# Patient Record
Sex: MALE | Race: WHITE | Employment: UNEMPLOYED | URBAN - METROPOLITAN AREA
[De-identification: names, ages, dates, MRNs, and addresses within clinical notes are randomized per-mention and may not be internally consistent; named-entity substitution may affect disease eponyms.]

---

## 2020-07-04 ENCOUNTER — HOSPITAL ENCOUNTER (EMERGENCY)
Age: 40
Discharge: HOME | End: 2020-07-04
Attending: EMERGENCY MEDICINE
Payer: COMMERCIAL

## 2020-07-04 VITALS
HEIGHT: 71 IN | SYSTOLIC BLOOD PRESSURE: 111 MMHG | BODY MASS INDEX: 21 KG/M2 | HEART RATE: 99 BPM | RESPIRATION RATE: 20 BRPM | WEIGHT: 150 LBS | DIASTOLIC BLOOD PRESSURE: 65 MMHG | OXYGEN SATURATION: 98 % | TEMPERATURE: 98 F

## 2020-07-04 LAB
CHP ED QC CHECK: YES
GLUCOSE BLD-MCNC: 97 MG/DL
METER GLUCOSE: 97 MG/DL (ref 74–99)

## 2020-07-04 PROCEDURE — 82962 GLUCOSE BLOOD TEST: CPT

## 2020-07-04 PROCEDURE — 99284 EMERGENCY DEPT VISIT MOD MDM: CPT

## 2020-07-04 NOTE — ED NOTES
Pt awake, asked what happen to him this nurse explained he was found unresponsive and administered narcan. Pt denies using drugs to cause a overdose. States he takes pain medication but no more that prescribed.        Flaca Hampton RN  07/04/20 2583

## 2020-10-16 ENCOUNTER — HOSPITAL ENCOUNTER (OUTPATIENT)
Dept: MAMMOGRAPHY | Age: 40
Discharge: HOME | End: 2020-10-18
Payer: COMMERCIAL

## 2020-10-16 PROCEDURE — 77080 DXA BONE DENSITY AXIAL: CPT

## 2022-09-17 ENCOUNTER — HOSPITAL ENCOUNTER (EMERGENCY)
Age: 42
Discharge: HOME | End: 2022-09-17
Attending: EMERGENCY MEDICINE
Payer: COMMERCIAL

## 2022-09-17 VITALS
HEART RATE: 85 BPM | OXYGEN SATURATION: 93 % | RESPIRATION RATE: 18 BRPM | DIASTOLIC BLOOD PRESSURE: 95 MMHG | HEIGHT: 71 IN | WEIGHT: 235 LBS | SYSTOLIC BLOOD PRESSURE: 143 MMHG | BODY MASS INDEX: 32.9 KG/M2 | TEMPERATURE: 97.7 F

## 2022-09-17 DIAGNOSIS — T40.601A OPIATE OVERDOSE, ACCIDENTAL OR UNINTENTIONAL, INITIAL ENCOUNTER (HCC): Primary | ICD-10-CM

## 2022-09-17 LAB
CHP ED QC CHECK: NORMAL
EKG ATRIAL RATE: 97 BPM
EKG P AXIS: 57 DEGREES
EKG P-R INTERVAL: 148 MS
EKG Q-T INTERVAL: 362 MS
EKG QRS DURATION: 92 MS
EKG QTC CALCULATION (BAZETT): 459 MS
EKG R AXIS: 16 DEGREES
EKG T AXIS: -8 DEGREES
EKG VENTRICULAR RATE: 97 BPM
GLUCOSE BLD-MCNC: 144 MG/DL
METER GLUCOSE: 144 MG/DL (ref 74–99)

## 2022-09-17 PROCEDURE — 99284 EMERGENCY DEPT VISIT MOD MDM: CPT

## 2022-09-17 PROCEDURE — 6370000000 HC RX 637 (ALT 250 FOR IP): Performed by: STUDENT IN AN ORGANIZED HEALTH CARE EDUCATION/TRAINING PROGRAM

## 2022-09-17 PROCEDURE — 82962 GLUCOSE BLOOD TEST: CPT

## 2022-09-17 PROCEDURE — 93005 ELECTROCARDIOGRAM TRACING: CPT | Performed by: STUDENT IN AN ORGANIZED HEALTH CARE EDUCATION/TRAINING PROGRAM

## 2022-09-17 PROCEDURE — 6360000002 HC RX W HCPCS: Performed by: STUDENT IN AN ORGANIZED HEALTH CARE EDUCATION/TRAINING PROGRAM

## 2022-09-17 PROCEDURE — 96374 THER/PROPH/DIAG INJ IV PUSH: CPT

## 2022-09-17 RX ORDER — ONDANSETRON 2 MG/ML
4 INJECTION INTRAMUSCULAR; INTRAVENOUS ONCE
Status: COMPLETED | OUTPATIENT
Start: 2022-09-17 | End: 2022-09-17

## 2022-09-17 RX ORDER — ACETAMINOPHEN 500 MG
1000 TABLET ORAL ONCE
Status: COMPLETED | OUTPATIENT
Start: 2022-09-17 | End: 2022-09-17

## 2022-09-17 RX ADMIN — ONDANSETRON 4 MG: 2 INJECTION INTRAMUSCULAR; INTRAVENOUS at 15:59

## 2022-09-17 RX ADMIN — ACETAMINOPHEN 1000 MG: 500 TABLET ORAL at 17:59

## 2022-09-17 ASSESSMENT — ENCOUNTER SYMPTOMS
EYE DISCHARGE: 0
ABDOMINAL PAIN: 0
VOMITING: 1
BACK PAIN: 0
NAUSEA: 1
SORE THROAT: 0
SINUS PRESSURE: 0
WHEEZING: 0
SHORTNESS OF BREATH: 0
DIARRHEA: 0
COUGH: 0
EYE PAIN: 0
EYE REDNESS: 0

## 2022-09-17 ASSESSMENT — PAIN DESCRIPTION - DESCRIPTORS: DESCRIPTORS: ACHING

## 2022-09-17 ASSESSMENT — PAIN DESCRIPTION - LOCATION: LOCATION: HEAD

## 2022-09-17 ASSESSMENT — PAIN SCALES - GENERAL: PAINLEVEL_OUTOF10: 5

## 2022-09-17 ASSESSMENT — PAIN - FUNCTIONAL ASSESSMENT: PAIN_FUNCTIONAL_ASSESSMENT: NONE - DENIES PAIN

## 2022-09-17 NOTE — PROGRESS NOTES
Patient offered free narcan kit prior to discharge. Patient denies intentional opiate use and states he does not need kit. Patient states he may have had his Norco bottle with him when he arrived. Requested bottle returned. Discussed this with the nurse that checked patient in; EMS reported patient did have a bottle of medicine with him when they arrived, but this bottle was never surrendered to our staff and nothing was collected from his pockets after his arrival. Norco bottle not in our department.       Clifton Onofre, PharmD 9/17/2022 7:47 PM   [phone]
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
## 2022-09-28 ENCOUNTER — HOSPITAL ENCOUNTER (OUTPATIENT)
Dept: CT IMAGING | Age: 42
Discharge: HOME | End: 2022-09-30
Payer: COMMERCIAL

## 2022-09-28 DIAGNOSIS — S92.901G: ICD-10-CM

## 2022-09-28 PROCEDURE — 73700 CT LOWER EXTREMITY W/O DYE: CPT

## 2022-10-08 NOTE — ED PROVIDER NOTES
EXAM--------------------------------------    Constitutional/General: Alert and oriented x3  Head: Normocephalic and atraumatic  Eyes: PERRL, EOMI, sclera non icteric  Mouth: Oropharynx clear, handling secretions, no trismus, no asymmetry of the posterior oropharynx or uvular edema  Neck: Supple, full ROM, no stridor, no meningeal signs  Respiratory: Lungs clear to auscultation bilaterally, no wheezes, rales, or rhonchi. Not in respiratory distress  Cardiovascular:  Regular rate. Regular rhythm. No murmurs, no aortic murmurs, no gallops, or rubs. 2+ distal pulses. Equal extremity pulses. Chest: No chest wall tenderness  GI:  Abdomen Soft, Non tender, Non distended. No rebound, guarding, or rigidity. No pulsatile masses. Musculoskeletal: Moves all extremities x 4. Warm and well perfused, no clubbing, cyanosis, or edema. Capillary refill <3 seconds  Integument: skin warm and dry. No rashes. Neurologic: GCS 15, no focal deficits, symmetric strength 5/5 in the upper and lower extremities bilaterally  Psychiatric: Normal Affect          -------------------------------------------------- RESULTS -------------------------------------------------  I have personally reviewed all laboratory and imaging results for this patient. Results are listed below. LABS: (Lab results interpreted by me)  Results for orders placed or performed during the hospital encounter of 07/04/20   POCT Glucose   Result Value Ref Range    Glucose 97 mg/dL    QC OK?  yes    POCT Glucose   Result Value Ref Range    Meter Glucose 97 74 - 99 mg/dL   ,       RADIOLOGY:  Interpreted by Radiologist unless otherwise specified  No orders to display           ------------------------- NURSING NOTES AND VITALS REVIEWED ---------------------------   The nursing notes within the ED encounter and vital signs as below have been reviewed by myself  /65   Pulse 99   Temp 98 °F (36.7 °C)   Resp 20   Ht 5' 11\" (1.803 m)   Wt 150 lb (68 kg)   SpO2 98% inadvertent computerized transcription errors may be present        Nicki Cuevas DO  07/04/20 7392 Gen: Awake, alert, comfortable, interactive, NAD, well appearing  Head: NCAT  ENT: MMM  Neck: Supple, Full ROM neck  CV: Heart RRR  Lungs: tachypneic, speaking without difficulty, scattered wheezing, subcostal retractions, faint tracheal tugging, no tripoding, no head bobbing or grunting  Abd: Abd soft, NTND, no organomegaly  Skin: Brisk CR. No rashes.

## 2023-03-18 ENCOUNTER — APPOINTMENT (OUTPATIENT)
Dept: ULTRASOUND IMAGING | Age: 43
End: 2023-03-18
Payer: COMMERCIAL

## 2023-03-18 VITALS
BODY MASS INDEX: 32.36 KG/M2 | OXYGEN SATURATION: 97 % | SYSTOLIC BLOOD PRESSURE: 147 MMHG | RESPIRATION RATE: 16 BRPM | HEART RATE: 101 BPM | DIASTOLIC BLOOD PRESSURE: 114 MMHG | TEMPERATURE: 97.9 F | WEIGHT: 232 LBS

## 2023-03-18 LAB
ALBUMIN SERPL-MCNC: 4.5 G/DL (ref 3.5–5.2)
ALP SERPL-CCNC: 98 U/L (ref 40–129)
ALT SERPL-CCNC: 99 U/L (ref 0–40)
ANION GAP SERPL CALCULATED.3IONS-SCNC: 13 MMOL/L (ref 7–16)
AST SERPL-CCNC: 68 U/L (ref 0–39)
BASOPHILS # BLD: 0.08 E9/L (ref 0–0.2)
BASOPHILS NFR BLD: 1 % (ref 0–2)
BILIRUB SERPL-MCNC: 0.5 MG/DL (ref 0–1.2)
BILIRUB UR QL STRIP: NEGATIVE
BUN SERPL-MCNC: 7 MG/DL (ref 6–20)
CALCIUM SERPL-MCNC: 9.5 MG/DL (ref 8.6–10.2)
CHLORIDE SERPL-SCNC: 103 MMOL/L (ref 98–107)
CLARITY UR: CLEAR
CO2 SERPL-SCNC: 22 MMOL/L (ref 22–29)
COLOR UR: YELLOW
CREAT SERPL-MCNC: 0.8 MG/DL (ref 0.7–1.2)
EOSINOPHIL # BLD: 0.2 E9/L (ref 0.05–0.5)
EOSINOPHIL NFR BLD: 2.5 % (ref 0–6)
ERYTHROCYTE [DISTWIDTH] IN BLOOD BY AUTOMATED COUNT: 13.2 FL (ref 11.5–15)
GLUCOSE SERPL-MCNC: 127 MG/DL (ref 74–99)
GLUCOSE UR STRIP-MCNC: NEGATIVE MG/DL
HCT VFR BLD AUTO: 48.3 % (ref 37–54)
HGB BLD-MCNC: 16.8 G/DL (ref 12.5–16.5)
HGB UR QL STRIP: NEGATIVE
IMM GRANULOCYTES # BLD: 0.04 E9/L
IMM GRANULOCYTES NFR BLD: 0.5 % (ref 0–5)
KETONES UR STRIP-MCNC: NEGATIVE MG/DL
LACTATE BLDV-SCNC: 1.5 MMOL/L (ref 0.5–2.2)
LEUKOCYTE ESTERASE UR QL STRIP: NEGATIVE
LIPASE: 69 U/L (ref 13–60)
LYMPHOCYTES # BLD: 1.19 E9/L (ref 1.5–4)
LYMPHOCYTES NFR BLD: 15 % (ref 20–42)
MCH RBC QN AUTO: 31.1 PG (ref 26–35)
MCHC RBC AUTO-ENTMCNC: 34.8 % (ref 32–34.5)
MCV RBC AUTO: 89.3 FL (ref 80–99.9)
MONOCYTES # BLD: 0.69 E9/L (ref 0.1–0.95)
MONOCYTES NFR BLD: 8.7 % (ref 2–12)
NEUTROPHILS # BLD: 5.75 E9/L (ref 1.8–7.3)
NEUTS SEG NFR BLD: 72.3 % (ref 43–80)
NITRITE UR QL STRIP: NEGATIVE
PH UR STRIP: 5.5 [PH] (ref 5–9)
PLATELET # BLD AUTO: 193 E9/L (ref 130–450)
PMV BLD AUTO: 9.6 FL (ref 7–12)
POTASSIUM SERPL-SCNC: 4.1 MMOL/L (ref 3.5–5)
PROT SERPL-MCNC: 7.8 G/DL (ref 6.4–8.3)
PROT UR STRIP-MCNC: NEGATIVE MG/DL
RBC # BLD AUTO: 5.41 E12/L (ref 3.8–5.8)
SODIUM SERPL-SCNC: 138 MMOL/L (ref 132–146)
SP GR UR STRIP: >=1.03 (ref 1–1.03)
TROPONIN, HIGH SENSITIVITY: <6 NG/L (ref 0–11)
UROBILINOGEN UR STRIP-ACNC: 0.2 E.U./DL
WBC # BLD: 8 E9/L (ref 4.5–11.5)

## 2023-03-18 PROCEDURE — 83605 ASSAY OF LACTIC ACID: CPT

## 2023-03-18 PROCEDURE — 85025 COMPLETE CBC W/AUTO DIFF WBC: CPT

## 2023-03-18 PROCEDURE — 80053 COMPREHEN METABOLIC PANEL: CPT

## 2023-03-18 PROCEDURE — 99284 EMERGENCY DEPT VISIT MOD MDM: CPT

## 2023-03-18 PROCEDURE — 93005 ELECTROCARDIOGRAM TRACING: CPT | Performed by: NURSE PRACTITIONER

## 2023-03-18 PROCEDURE — 81003 URINALYSIS AUTO W/O SCOPE: CPT

## 2023-03-18 PROCEDURE — 76705 ECHO EXAM OF ABDOMEN: CPT

## 2023-03-18 PROCEDURE — 84484 ASSAY OF TROPONIN QUANT: CPT

## 2023-03-18 PROCEDURE — 83690 ASSAY OF LIPASE: CPT

## 2023-03-18 RX ORDER — ONDANSETRON 2 MG/ML
4 INJECTION INTRAMUSCULAR; INTRAVENOUS ONCE
Status: DISCONTINUED | OUTPATIENT
Start: 2023-03-18 | End: 2023-03-19 | Stop reason: HOSPADM

## 2023-03-18 RX ORDER — 0.9 % SODIUM CHLORIDE 0.9 %
1000 INTRAVENOUS SOLUTION INTRAVENOUS ONCE
Status: DISCONTINUED | OUTPATIENT
Start: 2023-03-18 | End: 2023-03-19 | Stop reason: HOSPADM

## 2023-03-18 RX ORDER — MORPHINE SULFATE 4 MG/ML
4 INJECTION, SOLUTION INTRAMUSCULAR; INTRAVENOUS ONCE
Status: DISCONTINUED | OUTPATIENT
Start: 2023-03-18 | End: 2023-03-19 | Stop reason: HOSPADM

## 2023-03-19 ENCOUNTER — HOSPITAL ENCOUNTER (EMERGENCY)
Age: 43
Discharge: LEFT BEFORE DISCHARGE | End: 2023-03-19
Payer: COMMERCIAL

## 2023-03-19 DIAGNOSIS — R10.11 RIGHT UPPER QUADRANT ABDOMINAL PAIN: Primary | ICD-10-CM

## 2023-03-19 LAB
EKG ATRIAL RATE: 88 BPM
EKG P AXIS: 39 DEGREES
EKG P-R INTERVAL: 156 MS
EKG Q-T INTERVAL: 352 MS
EKG QRS DURATION: 78 MS
EKG QTC CALCULATION (BAZETT): 425 MS
EKG R AXIS: 21 DEGREES
EKG T AXIS: 35 DEGREES
EKG VENTRICULAR RATE: 88 BPM

## 2023-03-19 PROCEDURE — 93010 ELECTROCARDIOGRAM REPORT: CPT | Performed by: INTERNAL MEDICINE

## 2023-03-19 NOTE — ED NOTES
Pt up to pivot desk stating \" he was leaving, upset about waiting time\" this nurse trying to encourage pt to stay without success, pt aware that testing is not completed and he should be next pt to go back as soon as er room /bed available. pt voiced \"will go somewhere in morning , pt signed ama form . NP and charge RN notified of pt leaving.      Gerda Arceo, LPN  84/00/02 5539

## 2023-03-19 NOTE — ED NOTES
Department of Emergency Medicine  FIRST PROVIDER TRIAGE NOTE             Independent MLP           3/18/23  9:39 PM EDT    Date of Encounter: 3/18/23   MRN: [id number]      HPI: Mal Pimentel is a 36 y.o. male who presents to the ED for Abdominal Pain and Flank Pain (Pt to ED with c/o R-sided abdominal and flank pain x2 hours. +nausea. Denies  changes, -diarrhea. Pt denies CP, -SOB. Pt states pain started after eating a cheeseburger and fries. Pt A&Ox4, ambulatory, NAD)       ROS: Negative for fever or cough. PE: Gen Appearance/Constitutional: alert  GI: tender to palpation     Initial Plan of Care: All treatment areas with department are currently occupied. Plan to order/Initiate the following while awaiting opening in ED: labs, EKG, imaging studies, and ultrasound.   Initiate Treatment-Testing, Proceed toTreatment Area When Bed Available for ED Attending/MLP to Continue Care    Electronically signed by MARISOL Whitlock CNP   DD: 3/18/23       MARISOL Whitlock CNP  03/18/23 9172

## 2023-03-19 NOTE — ED PROVIDER NOTES
independent  HPI:  3/18/23, Time: 11:59 PM EDT         Claudetta Nims is a 36 y.o. male presenting to the ED for 2-hour history of right upper quadrant abdominal pain. Patient presents to the emergency department with wife who reports that shortly after eating patient began to have pain states its more right upper quadrant but will radiate down into the right lower. Patient does report that he thought maybe he needed to go the bathroom but still really had no significant relief. Denies any abdominal surgical history. Reports pain is constant. Denies any actual chest pain or shortness of breath as well as no associated back or flank pain. Patient also denies any blood noted in his urine or stool. Does express some nausea. Patient denies take anything for symptom relief. Review of Systems:   A complete review of systems was performed and pertinent positives and negatives are stated within HPI, all other systems reviewed and are negative.          --------------------------------------------- PAST HISTORY ---------------------------------------------  Past Medical History:  has no past medical history on file. Past Surgical History:  has no past surgical history on file. Social History:  reports that he has been smoking cigarettes. He has been smoking an average of .5 packs per day. He does not have any smokeless tobacco history on file. He reports current alcohol use of about 2.0 standard drinks per week. He reports that he does not use drugs. Family History: family history is not on file. The patients home medications have been reviewed. Allergies: Patient has no known allergies.     -------------------------------------------------- RESULTS -------------------------------------------------  All laboratory and radiology results have been personally reviewed by myself   LABS:  Results for orders placed or performed during the hospital encounter of 03/19/23   CBC with Auto Differential Result Value Ref Range    WBC 8.0 4.5 - 11.5 E9/L    RBC 5.41 3.80 - 5.80 E12/L    Hemoglobin 16.8 (H) 12.5 - 16.5 g/dL    Hematocrit 48.3 37.0 - 54.0 %    MCV 89.3 80.0 - 99.9 fL    MCH 31.1 26.0 - 35.0 pg    MCHC 34.8 (H) 32.0 - 34.5 %    RDW 13.2 11.5 - 15.0 fL    Platelets 912 871 - 731 E9/L    MPV 9.6 7.0 - 12.0 fL    Neutrophils % 72.3 43.0 - 80.0 %    Immature Granulocytes % 0.5 0.0 - 5.0 %    Lymphocytes % 15.0 (L) 20.0 - 42.0 %    Monocytes % 8.7 2.0 - 12.0 %    Eosinophils % 2.5 0.0 - 6.0 %    Basophils % 1.0 0.0 - 2.0 %    Neutrophils Absolute 5.75 1.80 - 7.30 E9/L    Immature Granulocytes # 0.04 E9/L    Lymphocytes Absolute 1.19 (L) 1.50 - 4.00 E9/L    Monocytes Absolute 0.69 0.10 - 0.95 E9/L    Eosinophils Absolute 0.20 0.05 - 0.50 E9/L    Basophils Absolute 0.08 0.00 - 0.20 E9/L   Comprehensive Metabolic Panel   Result Value Ref Range    Sodium 138 132 - 146 mmol/L    Potassium 4.1 3.5 - 5.0 mmol/L    Chloride 103 98 - 107 mmol/L    CO2 22 22 - 29 mmol/L    Anion Gap 13 7 - 16 mmol/L    Glucose 127 (H) 74 - 99 mg/dL    BUN 7 6 - 20 mg/dL    Creatinine 0.8 0.7 - 1.2 mg/dL    Est, Glom Filt Rate >60 >=60 mL/min/1.73    Calcium 9.5 8.6 - 10.2 mg/dL    Total Protein 7.8 6.4 - 8.3 g/dL    Albumin 4.5 3.5 - 5.2 g/dL    Total Bilirubin 0.5 0.0 - 1.2 mg/dL    Alkaline Phosphatase 98 40 - 129 U/L    ALT 99 (H) 0 - 40 U/L    AST 68 (H) 0 - 39 U/L   Lactic Acid   Result Value Ref Range    Lactic Acid 1.5 0.5 - 2.2 mmol/L   Lipase   Result Value Ref Range    Lipase 69 (H) 13 - 60 U/L   Urinalysis   Result Value Ref Range    Color, UA Yellow Straw/Yellow    Clarity, UA Clear Clear    Glucose, Ur Negative Negative mg/dL    Bilirubin Urine Negative Negative    Ketones, Urine Negative Negative mg/dL    Specific Gravity, UA >=1.030 1.005 - 1.030    Blood, Urine Negative Negative    pH, UA 5.5 5.0 - 9.0    Protein, UA Negative Negative mg/dL    Urobilinogen, Urine 0.2 <2.0 E.U./dL    Nitrite, Urine Negative Negative Leukocyte Esterase, Urine Negative Negative   Troponin   Result Value Ref Range    Troponin, High Sensitivity <6 0 - 11 ng/L   EKG 12 Lead   Result Value Ref Range    Ventricular Rate 88 BPM    Atrial Rate 88 BPM    P-R Interval 156 ms    QRS Duration 78 ms    Q-T Interval 352 ms    QTc Calculation (Bazett) 425 ms    P Axis 39 degrees    R Axis 21 degrees    T Axis 35 degrees       RADIOLOGY:  Interpreted by Radiologist.  US GALLBLADDER RUQ   Final Result   Normal appearance of the imaged gallbladder. No cholelithiasis or signs of   cholecystitis. Negative sonographic Quince Mealy sign reported. Normal appearance   of the extrahepatic common bile duct. CT ABDOMEN PELVIS W IV CONTRAST Additional Contrast? None    (Results Pending)       ------------------------- NURSING NOTES AND VITALS REVIEWED ---------------------------   The nursing notes within the ED encounter and vital signs as below have been reviewed. BP (!) 147/114   Pulse (!) 101   Temp 97.9 °F (36.6 °C) (Oral)   Resp 16   Wt 232 lb (105.2 kg)   SpO2 97%   BMI 32.36 kg/m²   Oxygen Saturation Interpretation: Normal      ---------------------------------------------------PHYSICAL EXAM--------------------------------------      Constitutional/General: Alert and oriented, mildly uncomfortable  Head: Normocephalic and atraumatic  Eyes: PERRL, EOMI  Mouth: Oropharynx clear, handling secretions, no trismus  Neck: Supple, full ROM,   Pulmonary: Lungs clear to auscultation bilaterally, no wheezes, rales, or rhonchi. Not in respiratory distress  Cardiovascular:  Regular rate and rhythm, no murmurs, gallops, or rubs. 2+ distal pulses  Abdomen: Soft, tender, non distended, notable point tenderness to right upper quadrant, mid epigastric. No obvious point tenderness to right CVA region, mild point tenderness right lower quadrant. Extremities: Moves all extremities x 4.  Warm and well perfused  Skin: warm and dry without rash  Neurologic: GCS 15,  Psych: Normal Affect      ------------------------------ ED COURSE/MEDICAL DECISION MAKING----------------------  Medications   0.9 % sodium chloride bolus (has no administration in time range)   morphine sulfate (PF) injection 4 mg (has no administration in time range)   ondansetron (ZOFRAN) injection 4 mg (has no administration in time range)         ED COURSE: Twelve-lead EKG as interpreted and reviewed by the emergency room attending as well as myself showing heart rate of 88, normal sinus rhythm. No acute ST elevation or depression noted. Possible left atrial enlargement. Medical Decision Making:    Yoselin Crawford is a 36 y.o. male presenting to the ED for 2-hour history of right upper quadrant abdominal pain. Patient presents to the emergency department with wife who reports that shortly after eating patient began to have pain states its more right upper quadrant but will radiate down into the right lower. Patient does report that he thought maybe he needed to go the bathroom but still really had no significant relief. Denies any abdominal surgical history. Reports pain is constant. Denies any actual chest pain or shortness of breath as well as no associated back or flank pain. Patient also denies any blood noted in his urine or stool. Does express some nausea. Patient denies take anything for symptom relief. Differential diagnosis includes acute cholecystitis versus acute appendicitis versus renal calculi. Plan be for labs will also obtain imaging. Labs resulted CBC unremarkable, chemistry panel with slight elevation in liver enzymes, ALT 9 9, AST 68. Total bili negative, lactic acid level negative lipase slightly elevated at 69. Urinalysis negative, troponin negative. I did make them aware plan will be to start with an ultrasound as patient's pain started shortly after eating a greasy hamburger and fries.   Patient's ultrasound resulted showing normal appearance of the imaged gallbladder, no cholelithiasis or signs of cholecystitis. Normal appearance of the extrahepatic common bile duct. I personally did go out to the waiting room to speak with patient to update him on the ultrasound that was negative and the plan will be to order a CAT scan of the abdomen pelvis. I did provide apology for weight and that we are currently waiting for a bed in the main emergency department so he can be properly medicated. They were thankful and agreeable to stay. Shortly after nursing staff came to me to report that they left, they no longer wanted to wait. I was not able to go back out as they had already informed me after patient left. Patient eloped before treatment completed. Counseling: The emergency provider has spoken with the patient and discussed todays results, in addition to providing specific details for the plan of care and counseling regarding the diagnosis and prognosis. Questions are answered at this time and they are agreeable with the plan.      --------------------------------- IMPRESSION AND DISPOSITION ---------------------------------    IMPRESSION  1. Right upper quadrant abdominal pain        DISPOSITION  Disposition: ELOPED  Patient condition is stable      NOTE: This report was transcribed using voice recognition software.  Every effort was made to ensure accuracy; however, inadvertent computerized transcription errors may be present      MARISOL Chew - CNP  03/19/23 0108

## 2023-03-22 ENCOUNTER — HOSPITAL ENCOUNTER (EMERGENCY)
Age: 43
Discharge: HOME | End: 2023-03-23
Attending: STUDENT IN AN ORGANIZED HEALTH CARE EDUCATION/TRAINING PROGRAM
Payer: COMMERCIAL

## 2023-03-22 ENCOUNTER — APPOINTMENT (OUTPATIENT)
Dept: GENERAL RADIOLOGY | Age: 43
End: 2023-03-22
Payer: COMMERCIAL

## 2023-03-22 ENCOUNTER — APPOINTMENT (OUTPATIENT)
Dept: CT IMAGING | Age: 43
End: 2023-03-22
Payer: COMMERCIAL
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
## 2023-03-22 DIAGNOSIS — S82.841A CLOSED BIMALLEOLAR FRACTURE OF RIGHT ANKLE, INITIAL ENCOUNTER: Primary | ICD-10-CM

## 2023-03-22 PROCEDURE — 96374 THER/PROPH/DIAG INJ IV PUSH: CPT

## 2023-03-22 PROCEDURE — 73610 X-RAY EXAM OF ANKLE: CPT

## 2023-03-22 PROCEDURE — 71045 X-RAY EXAM CHEST 1 VIEW: CPT

## 2023-03-22 PROCEDURE — 73630 X-RAY EXAM OF FOOT: CPT

## 2023-03-22 PROCEDURE — 99285 EMERGENCY DEPT VISIT HI MDM: CPT

## 2023-03-22 PROCEDURE — 99152 MOD SED SAME PHYS/QHP 5/>YRS: CPT

## 2023-03-22 PROCEDURE — 72125 CT NECK SPINE W/O DYE: CPT

## 2023-03-22 PROCEDURE — 70450 CT HEAD/BRAIN W/O DYE: CPT

## 2023-03-22 PROCEDURE — 73502 X-RAY EXAM HIP UNI 2-3 VIEWS: CPT

## 2023-03-22 PROCEDURE — 73590 X-RAY EXAM OF LOWER LEG: CPT

## 2023-03-22 ASSESSMENT — LIFESTYLE VARIABLES
HOW MANY STANDARD DRINKS CONTAINING ALCOHOL DO YOU HAVE ON A TYPICAL DAY: 5 OR 6
HOW OFTEN DO YOU HAVE A DRINK CONTAINING ALCOHOL: 4 OR MORE TIMES A WEEK

## 2023-03-22 ASSESSMENT — ENCOUNTER SYMPTOMS
COUGH: 0
VOMITING: 0
ABDOMINAL PAIN: 0
CHEST TIGHTNESS: 0
PHOTOPHOBIA: 0
NAUSEA: 0
DIARRHEA: 0
SHORTNESS OF BREATH: 0
BACK PAIN: 0
ABDOMINAL DISTENTION: 0

## 2023-03-22 ASSESSMENT — PAIN SCALES - GENERAL: PAINLEVEL_OUTOF10: 5

## 2023-03-22 ASSESSMENT — PAIN DESCRIPTION - ORIENTATION: ORIENTATION: RIGHT

## 2023-03-22 ASSESSMENT — PAIN - FUNCTIONAL ASSESSMENT: PAIN_FUNCTIONAL_ASSESSMENT: 0-10

## 2023-03-22 ASSESSMENT — PAIN DESCRIPTION - DESCRIPTORS: DESCRIPTORS: ACHING

## 2023-03-22 ASSESSMENT — PAIN DESCRIPTION - LOCATION: LOCATION: ANKLE

## 2023-03-23 ENCOUNTER — APPOINTMENT (OUTPATIENT)
Dept: GENERAL RADIOLOGY | Age: 43
End: 2023-03-23
Payer: COMMERCIAL

## 2023-03-23 VITALS
WEIGHT: 232 LBS | TEMPERATURE: 97.8 F | HEIGHT: 70 IN | RESPIRATION RATE: 16 BRPM | OXYGEN SATURATION: 95 % | DIASTOLIC BLOOD PRESSURE: 103 MMHG | BODY MASS INDEX: 33.21 KG/M2 | SYSTOLIC BLOOD PRESSURE: 158 MMHG | HEART RATE: 95 BPM

## 2023-03-23 PROCEDURE — 6360000002 HC RX W HCPCS: Performed by: STUDENT IN AN ORGANIZED HEALTH CARE EDUCATION/TRAINING PROGRAM

## 2023-03-23 PROCEDURE — 73600 X-RAY EXAM OF ANKLE: CPT

## 2023-03-23 PROCEDURE — 99285 EMERGENCY DEPT VISIT HI MDM: CPT

## 2023-03-23 PROCEDURE — 6370000000 HC RX 637 (ALT 250 FOR IP): Performed by: STUDENT IN AN ORGANIZED HEALTH CARE EDUCATION/TRAINING PROGRAM

## 2023-03-23 PROCEDURE — 96374 THER/PROPH/DIAG INJ IV PUSH: CPT

## 2023-03-23 RX ORDER — OXYCODONE HYDROCHLORIDE AND ACETAMINOPHEN 5; 325 MG/1; MG/1
1 TABLET ORAL ONCE
Status: COMPLETED | OUTPATIENT
Start: 2023-03-23 | End: 2023-03-23

## 2023-03-23 RX ORDER — OXYCODONE HYDROCHLORIDE AND ACETAMINOPHEN 5; 325 MG/1; MG/1
1 TABLET ORAL EVERY 6 HOURS PRN
Qty: 12 TABLET | Refills: 0 | Status: SHIPPED | OUTPATIENT
Start: 2023-03-23 | End: 2023-03-26

## 2023-03-23 RX ORDER — PROPOFOL 10 MG/ML
100 INJECTION, EMULSION INTRAVENOUS
Status: COMPLETED | OUTPATIENT
Start: 2023-03-23 | End: 2023-03-23

## 2023-03-23 RX ORDER — MORPHINE SULFATE 4 MG/ML
4 INJECTION, SOLUTION INTRAMUSCULAR; INTRAVENOUS ONCE
Status: COMPLETED | OUTPATIENT
Start: 2023-03-23 | End: 2023-03-23

## 2023-03-23 RX ADMIN — PROPOFOL 100 MG: 10 INJECTION, EMULSION INTRAVENOUS at 03:08

## 2023-03-23 RX ADMIN — PROPOFOL 100 MG: 10 INJECTION, EMULSION INTRAVENOUS at 03:05

## 2023-03-23 RX ADMIN — OXYCODONE AND ACETAMINOPHEN 1 TABLET: 5; 325 TABLET ORAL at 05:39

## 2023-03-23 RX ADMIN — MORPHINE SULFATE 4 MG: 4 INJECTION, SOLUTION INTRAMUSCULAR; INTRAVENOUS at 00:45

## 2023-03-23 NOTE — CONSULTS
dislocation. PLAN:  Nonweightbearing right lower extremity  Multimodal pain management  Rest ice elevate the right lower extremity  Patient was sedated using propofol, followed by a reduction of the right ankle and application of a well-padded 3 sided splint.   Patient informed of the instability of his fracture and the likely need of surgical intervention  Please keep the splint clean dry and intact  Patient will follow-up on an outpatient basis with Dr. Charissa Lemons for discharge from orthopedic surgery standpoint      Will discuss with attending     Yanique Morgan DO  PGY-1 Orthopedic Surgery
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
## 2023-03-23 NOTE — ED NOTES
Crutches given to patient and educated on proper use. Discharge instructions reviewed with patient , no questions at this time.      Katharine Baker RN  03/23/23 [id number]

## 2023-03-29 ENCOUNTER — HOSPITAL ENCOUNTER (OUTPATIENT)
Dept: GENERAL RADIOLOGY | Age: 43
Discharge: HOME | End: 2023-03-31
Payer: COMMERCIAL

## 2023-03-29 ENCOUNTER — TELEPHONE (OUTPATIENT)
Dept: ORTHOPEDIC SURGERY | Age: 43
End: 2023-03-29

## 2023-03-29 ENCOUNTER — ANESTHESIA EVENT (OUTPATIENT)
Dept: OPERATING ROOM | Age: 43
End: 2023-03-29
Payer: COMMERCIAL

## 2023-03-29 ENCOUNTER — PREP FOR PROCEDURE (OUTPATIENT)
Dept: ORTHOPEDIC SURGERY | Age: 43
End: 2023-03-29

## 2023-03-29 ENCOUNTER — OFFICE VISIT (OUTPATIENT)
Dept: ORTHOPEDIC SURGERY | Age: 43
End: 2023-03-29
Payer: COMMERCIAL

## 2023-03-29 DIAGNOSIS — T14.8XXA FRACTURE: Primary | ICD-10-CM

## 2023-03-29 DIAGNOSIS — T14.8XXA FRACTURE: ICD-10-CM

## 2023-03-29 PROBLEM — S82.841A CLOSED BIMALLEOLAR FRACTURE OF RIGHT ANKLE: Status: ACTIVE | Noted: 2023-03-29

## 2023-03-29 PROCEDURE — G8484 FLU IMMUNIZE NO ADMIN: HCPCS | Performed by: ORTHOPAEDIC SURGERY

## 2023-03-29 PROCEDURE — 4004F PT TOBACCO SCREEN RCVD TLK: CPT | Performed by: ORTHOPAEDIC SURGERY

## 2023-03-29 PROCEDURE — 73610 X-RAY EXAM OF ANKLE: CPT

## 2023-03-29 PROCEDURE — 99203 OFFICE O/P NEW LOW 30 MIN: CPT

## 2023-03-29 PROCEDURE — 99205 OFFICE O/P NEW HI 60 MIN: CPT | Performed by: ORTHOPAEDIC SURGERY

## 2023-03-29 PROCEDURE — G8427 DOCREV CUR MEDS BY ELIG CLIN: HCPCS | Performed by: ORTHOPAEDIC SURGERY

## 2023-03-29 PROCEDURE — 29515 APPLICATION SHORT LEG SPLINT: CPT

## 2023-03-29 PROCEDURE — G8417 CALC BMI ABV UP PARAM F/U: HCPCS | Performed by: ORTHOPAEDIC SURGERY

## 2023-03-29 RX ORDER — ACETAMINOPHEN 500 MG
1000 TABLET ORAL EVERY 6 HOURS PRN
Status: ON HOLD | COMMUNITY
End: 2023-03-30 | Stop reason: HOSPADM

## 2023-03-29 RX ORDER — OXYCODONE HYDROCHLORIDE AND ACETAMINOPHEN 5; 325 MG/1; MG/1
1 TABLET ORAL EVERY 8 HOURS PRN
Qty: 21 TABLET | Refills: 0 | Status: SHIPPED | OUTPATIENT
Start: 2023-03-29 | End: 2023-04-05

## 2023-03-29 RX ORDER — TRAZODONE HYDROCHLORIDE 100 MG/1
100 TABLET ORAL NIGHTLY
COMMUNITY
Start: 2023-03-28

## 2023-03-29 RX ORDER — LOSARTAN POTASSIUM 100 MG/1
100 TABLET ORAL EVERY MORNING
COMMUNITY
Start: 2023-03-28
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
## 2023-03-29 NOTE — TELEPHONE ENCOUNTER
Called and spoke with patient. Right Ankle surgery was scheduled for 4-3-2023 @ 10 am @ [facility] with . After further review, surgery date was moved to 3- with surgery starting at 10:30 am, hospital arrival time of 8:30 am.. I called patient to advise him of new surgery date/time plus hospital arrival time. Patient verbalized understanding.

## 2023-03-29 NOTE — Clinical Note
Procedure: Right Ankle Open Reduction with internal fixation, Code: 41817,02164, Dr. Deirdre Apgar, MD, Date: 4/3/23, Length (with 30 min clean up); 90. Anesthesia: General, Peripheral Nerve Block yes. Vendor [employer] (plate + synch fix) Requesting- Outpatient Clearance needed?  No Patient residing: [ ]SNF [ [facility] [place] [X]N/A
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
## 2023-03-29 NOTE — TELEPHONE ENCOUNTER
Prior Authorization Form:    DEMOGRAPHICS:                     Patient Name:  Garett Nicole  Patient :  1980            Insurance:  Payor: Simon Person / Plan: [employer] / Product Type: *No Product type* /   Insurance ID Number:    Payer/Plan Subscr  Sex Relation Sub. Ins. ID Effective Group Num   1. [facility] - * MARYSOL BAI* 1980 Male Self [id number]                                   [address]       [] Prior authorization obtained  [x] No Prior authorization required per [employer]. Decision ID # [id number], 3-.   [] Prior authorization pending - Case #       DIAGNOSIS & PROCEDURE:                       Procedure/Operation: Right Ankle Fracture ORIF, Syndesmosis Repair of Right Ankle           CPT Code: 32161, 04306    Diagnosis:  Closed Bimalleolar Right Ankle Fracture    ICD10 Code: F70.093B    Location:  On license of [facility]    Surgeon:  Dr. Mamadou Jefferson INFORMATION:                          Date: 3- (r/s from 4-3-2023)     Time: 10:30 am              Anesthesia:  General                                                        Status:  Outpatient        Special Comments:      *Need Plate SynchFix       Vendor: [employer]  []   Notified     Length of Surgery (with 30 min clean up time): 1.5 hours    Medical clearance: No Medical Clearance Needed    Pre-Op Labs:       []  Orders Placed    Electronically signed by Pepito Howard MA on 3/29/2023 at 11:36 AM

## 2023-03-29 NOTE — PATIENT INSTRUCTIONS
These devices may stay in your body from now on. The doctor closes the incision with stitches. You will have a scar, but it will fade with time. You may spend from a few hours to a few days in the hospital. This depends on how serious your injury is. It usually takes 6 to 12 weeks for a broken bone to heal.    How soon you can go back to work and your normal routine depends on your job. It also depends on how long it takes your bone to heal. For example, if you have a broken leg and you sit at work, you may be able to go back in 1 to 2 weeks. But if your job requires you to walk or stand a lot, you may need to wait until your bone has healed.

## 2023-03-30 ENCOUNTER — ANESTHESIA (OUTPATIENT)
Dept: OPERATING ROOM | Age: 43
End: 2023-03-30
Payer: COMMERCIAL

## 2023-03-30 ENCOUNTER — APPOINTMENT (OUTPATIENT)
Dept: GENERAL RADIOLOGY | Age: 43
End: 2023-03-30
Attending: ORTHOPAEDIC SURGERY
Payer: COMMERCIAL

## 2023-03-30 ENCOUNTER — HOSPITAL ENCOUNTER (OUTPATIENT)
Age: 43
Setting detail: OUTPATIENT SURGERY
Discharge: HOME | End: 2023-03-30
Attending: ORTHOPAEDIC SURGERY | Admitting: ORTHOPAEDIC SURGERY
Payer: COMMERCIAL

## 2023-03-30 VITALS
HEART RATE: 85 BPM | SYSTOLIC BLOOD PRESSURE: 110 MMHG | HEIGHT: 70 IN | OXYGEN SATURATION: 96 % | TEMPERATURE: 97.6 F | BODY MASS INDEX: 33.21 KG/M2 | RESPIRATION RATE: 20 BRPM | DIASTOLIC BLOOD PRESSURE: 80 MMHG | WEIGHT: 232 LBS

## 2023-03-30 PROCEDURE — 2500000003 HC RX 250 WO HCPCS: Performed by: NURSE ANESTHETIST, CERTIFIED REGISTERED

## 2023-03-30 PROCEDURE — 27792 TREATMENT OF ANKLE FRACTURE: CPT | Performed by: ORTHOPAEDIC SURGERY

## 2023-03-30 PROCEDURE — 64445 NJX AA&/STRD SCIATIC NRV IMG: CPT | Performed by: ANESTHESIOLOGY

## 2023-03-30 PROCEDURE — 73610 X-RAY EXAM OF ANKLE: CPT

## 2023-03-30 PROCEDURE — 3209999900 FLUORO FOR SURGICAL PROCEDURES

## 2023-03-30 PROCEDURE — 6360000002 HC RX W HCPCS: Performed by: NURSE ANESTHETIST, CERTIFIED REGISTERED

## 2023-03-30 PROCEDURE — C1713 ANCHOR/SCREW BN/BN,TIS/BN: HCPCS | Performed by: ORTHOPAEDIC SURGERY

## 2023-03-30 PROCEDURE — 2720000010 HC SURG SUPPLY STERILE: Performed by: ORTHOPAEDIC SURGERY

## 2023-03-30 PROCEDURE — 3700000000 HC ANESTHESIA ATTENDED CARE: Performed by: ORTHOPAEDIC SURGERY

## 2023-03-30 PROCEDURE — 2500000003 HC RX 250 WO HCPCS

## 2023-03-30 PROCEDURE — 2580000003 HC RX 258: Performed by: PHYSICIAN ASSISTANT

## 2023-03-30 PROCEDURE — 6360000002 HC RX W HCPCS: Performed by: PHYSICIAN ASSISTANT

## 2023-03-30 PROCEDURE — 7100000010 HC PHASE II RECOVERY - FIRST 15 MIN: Performed by: ORTHOPAEDIC SURGERY

## 2023-03-30 PROCEDURE — 6360000002 HC RX W HCPCS

## 2023-03-30 PROCEDURE — 3700000001 HC ADD 15 MINUTES (ANESTHESIA): Performed by: ORTHOPAEDIC SURGERY

## 2023-03-30 PROCEDURE — 2709999900 HC NON-CHARGEABLE SUPPLY: Performed by: ORTHOPAEDIC SURGERY

## 2023-03-30 PROCEDURE — 7100000000 HC PACU RECOVERY - FIRST 15 MIN: Performed by: ORTHOPAEDIC SURGERY

## 2023-03-30 PROCEDURE — 3600000005 HC SURGERY LEVEL 5 BASE: Performed by: ORTHOPAEDIC SURGERY

## 2023-03-30 PROCEDURE — 3600000015 HC SURGERY LEVEL 5 ADDTL 15MIN: Performed by: ORTHOPAEDIC SURGERY

## 2023-03-30 PROCEDURE — 7100000001 HC PACU RECOVERY - ADDTL 15 MIN: Performed by: ORTHOPAEDIC SURGERY

## 2023-03-30 PROCEDURE — 27829 TREAT LOWER LEG JOINT: CPT | Performed by: ORTHOPAEDIC SURGERY

## 2023-03-30 PROCEDURE — 7100000011 HC PHASE II RECOVERY - ADDTL 15 MIN: Performed by: ORTHOPAEDIC SURGERY

## 2023-03-30 DEVICE — LOCKING SCREW
Type: IMPLANTABLE DEVICE | Site: ANKLE | Status: FUNCTIONAL
Brand: VARIAX

## 2023-03-30 DEVICE — DISTAL LATERAL FIBULA PLATE, 6 HOLE
Type: IMPLANTABLE DEVICE | Site: ANKLE | Status: FUNCTIONAL
Brand: VARIAX

## 2023-03-30 DEVICE — IMPLANTABLE DEVICE
Type: IMPLANTABLE DEVICE | Site: ANKLE | Status: FUNCTIONAL
Brand: GRAVITY SYNCHFIX

## 2023-03-30 DEVICE — BONE SCREW
Type: IMPLANTABLE DEVICE | Site: ANKLE | Status: FUNCTIONAL
Brand: VARIAX

## 2023-03-30 RX ORDER — GLYCOPYRROLATE 0.2 MG/ML
INJECTION INTRAMUSCULAR; INTRAVENOUS PRN
Status: DISCONTINUED | OUTPATIENT
Start: 2023-03-30 | End: 2023-03-30 | Stop reason: SDUPTHER

## 2023-03-30 RX ORDER — FENTANYL CITRATE 50 UG/ML
50 INJECTION, SOLUTION INTRAMUSCULAR; INTRAVENOUS EVERY 10 MIN PRN
Status: DISCONTINUED | OUTPATIENT
Start: 2023-03-30 | End: 2023-03-30 | Stop reason: HOSPADM

## 2023-03-30 RX ORDER — SODIUM CHLORIDE 0.9 % (FLUSH) 0.9 %
5-40 SYRINGE (ML) INJECTION EVERY 12 HOURS SCHEDULED
Status: CANCELLED | OUTPATIENT
Start: 2023-03-30

## 2023-03-30 RX ORDER — SODIUM CHLORIDE 9 MG/ML
INJECTION, SOLUTION INTRAVENOUS PRN
Status: CANCELLED | OUTPATIENT
Start: 2023-03-30

## 2023-03-30 RX ORDER — ROPIVACAINE HYDROCHLORIDE 5 MG/ML
30 INJECTION, SOLUTION EPIDURAL; INFILTRATION; PERINEURAL ONCE
Status: COMPLETED | OUTPATIENT
Start: 2023-03-30 | End: 2023-03-30

## 2023-03-30 RX ORDER — MIDAZOLAM HYDROCHLORIDE 2 MG/2ML
1 INJECTION, SOLUTION INTRAMUSCULAR; INTRAVENOUS PRN
Status: DISCONTINUED | OUTPATIENT
Start: 2023-03-30 | End: 2023-03-30 | Stop reason: HOSPADM

## 2023-03-30 RX ORDER — SODIUM CHLORIDE 0.9 % (FLUSH) 0.9 %
5-40 SYRINGE (ML) INJECTION EVERY 12 HOURS SCHEDULED
Status: DISCONTINUED | OUTPATIENT
Start: 2023-03-30 | End: 2023-03-30

## 2023-03-30 RX ORDER — ROCURONIUM BROMIDE 10 MG/ML
INJECTION, SOLUTION INTRAVENOUS PRN
Status: DISCONTINUED | OUTPATIENT
Start: 2023-03-30 | End: 2023-03-30 | Stop reason: SDUPTHER

## 2023-03-30 RX ORDER — SODIUM CHLORIDE 0.9 % (FLUSH) 0.9 %
5-40 SYRINGE (ML) INJECTION PRN
Status: DISCONTINUED | OUTPATIENT
Start: 2023-03-30 | End: 2023-03-30

## 2023-03-30 RX ORDER — ROPIVACAINE HYDROCHLORIDE 5 MG/ML
INJECTION, SOLUTION EPIDURAL; INFILTRATION; PERINEURAL
Status: COMPLETED
Start: 2023-03-30 | End: 2023-03-30

## 2023-03-30 RX ORDER — SODIUM CHLORIDE 9 MG/ML
INJECTION, SOLUTION INTRAVENOUS PRN
Status: DISCONTINUED | OUTPATIENT
Start: 2023-03-30 | End: 2023-03-30

## 2023-03-30 RX ORDER — SODIUM CHLORIDE 9 MG/ML
INJECTION, SOLUTION INTRAVENOUS PRN
Status: DISCONTINUED | OUTPATIENT
Start: 2023-03-30 | End: 2023-03-30 | Stop reason: HOSPADM

## 2023-03-30 RX ORDER — LABETALOL HYDROCHLORIDE 5 MG/ML
INJECTION, SOLUTION INTRAVENOUS PRN
Status: DISCONTINUED | OUTPATIENT
Start: 2023-03-30 | End: 2023-03-30 | Stop reason: SDUPTHER

## 2023-03-30 RX ORDER — PROPOFOL 10 MG/ML
INJECTION, EMULSION INTRAVENOUS PRN
Status: DISCONTINUED | OUTPATIENT
Start: 2023-03-30 | End: 2023-03-30 | Stop reason: SDUPTHER

## 2023-03-30 RX ORDER — ROPIVACAINE HYDROCHLORIDE 5 MG/ML
30 INJECTION, SOLUTION EPIDURAL; INFILTRATION; PERINEURAL ONCE
Status: DISCONTINUED | OUTPATIENT
Start: 2023-03-30 | End: 2023-03-30 | Stop reason: HOSPADM

## 2023-03-30 RX ORDER — MIDAZOLAM HYDROCHLORIDE 1 MG/ML
INJECTION INTRAMUSCULAR; INTRAVENOUS
Status: COMPLETED
Start: 2023-03-30 | End: 2023-03-30

## 2023-03-30 RX ORDER — SODIUM CHLORIDE 0.9 % (FLUSH) 0.9 %
5-40 SYRINGE (ML) INJECTION PRN
Status: DISCONTINUED | OUTPATIENT
Start: 2023-03-30 | End: 2023-03-30 | Stop reason: HOSPADM

## 2023-03-30 RX ORDER — NEOSTIGMINE METHYLSULFATE 1 MG/ML
INJECTION, SOLUTION INTRAVENOUS PRN
Status: DISCONTINUED | OUTPATIENT
Start: 2023-03-30 | End: 2023-03-30 | Stop reason: SDUPTHER

## 2023-03-30 RX ORDER — SODIUM CHLORIDE 0.9 % (FLUSH) 0.9 %
5-40 SYRINGE (ML) INJECTION EVERY 12 HOURS SCHEDULED
Status: DISCONTINUED | OUTPATIENT
Start: 2023-03-30 | End: 2023-03-30 | Stop reason: HOSPADM

## 2023-03-30 RX ORDER — ONDANSETRON 2 MG/ML
INJECTION INTRAMUSCULAR; INTRAVENOUS PRN
Status: DISCONTINUED | OUTPATIENT
Start: 2023-03-30 | End: 2023-03-30 | Stop reason: SDUPTHER

## 2023-03-30 RX ORDER — FENTANYL CITRATE 50 UG/ML
INJECTION, SOLUTION INTRAMUSCULAR; INTRAVENOUS PRN
Status: DISCONTINUED | OUTPATIENT
Start: 2023-03-30 | End: 2023-03-30 | Stop reason: SDUPTHER

## 2023-03-30 RX ORDER — DEXAMETHASONE SODIUM PHOSPHATE 10 MG/ML
INJECTION INTRAMUSCULAR; INTRAVENOUS PRN
Status: DISCONTINUED | OUTPATIENT
Start: 2023-03-30 | End: 2023-03-30 | Stop reason: SDUPTHER

## 2023-03-30 RX ORDER — FENTANYL CITRATE 50 UG/ML
INJECTION, SOLUTION INTRAMUSCULAR; INTRAVENOUS
Status: COMPLETED
Start: 2023-03-30 | End: 2023-03-30

## 2023-03-30 RX ORDER — ONDANSETRON 2 MG/ML
4 INJECTION INTRAMUSCULAR; INTRAVENOUS
Status: DISCONTINUED | OUTPATIENT
Start: 2023-03-30 | End: 2023-03-30

## 2023-03-30 RX ORDER — LIDOCAINE HYDROCHLORIDE 20 MG/ML
INJECTION, SOLUTION INTRAVENOUS PRN
Status: DISCONTINUED | OUTPATIENT
Start: 2023-03-30 | End: 2023-03-30 | Stop reason: SDUPTHER

## 2023-03-30 RX ORDER — SODIUM CHLORIDE 0.9 % (FLUSH) 0.9 %
5-40 SYRINGE (ML) INJECTION PRN
Status: CANCELLED | OUTPATIENT
Start: 2023-03-30

## 2023-03-30 RX ADMIN — PROPOFOL 200 MG: 10 INJECTION, EMULSION INTRAVENOUS at 10:22

## 2023-03-30 RX ADMIN — FENTANYL CITRATE 100 MCG: 50 INJECTION, SOLUTION INTRAMUSCULAR; INTRAVENOUS at 09:49

## 2023-03-30 RX ADMIN — LABETALOL HYDROCHLORIDE 5 MG: 5 INJECTION INTRAVENOUS at 10:22

## 2023-03-30 RX ADMIN — ROPIVACAINE HYDROCHLORIDE 40 ML: 5 INJECTION, SOLUTION EPIDURAL; INFILTRATION; PERINEURAL at 09:49

## 2023-03-30 RX ADMIN — FENTANYL CITRATE 100 MCG: 50 INJECTION, SOLUTION INTRAMUSCULAR; INTRAVENOUS at 10:22

## 2023-03-30 RX ADMIN — LIDOCAINE HYDROCHLORIDE 100 MG: 20 INJECTION, SOLUTION INTRAVENOUS at 10:22

## 2023-03-30 RX ADMIN — ROPIVACAINE HYDROCHLORIDE 40 ML: 5 INJECTION EPIDURAL; INFILTRATION; PERINEURAL at 09:49

## 2023-03-30 RX ADMIN — Medication 1.5 MG: at 11:35

## 2023-03-30 RX ADMIN — MIDAZOLAM HYDROCHLORIDE 2 MG: 2 INJECTION, SOLUTION INTRAMUSCULAR; INTRAVENOUS at 09:46

## 2023-03-30 RX ADMIN — GLYCOPYRROLATE 0.3 MG: 0.2 INJECTION INTRAMUSCULAR; INTRAVENOUS at 11:35

## 2023-03-30 RX ADMIN — ROCURONIUM BROMIDE 50 MG: 10 INJECTION, SOLUTION INTRAVENOUS at 10:22

## 2023-03-30 RX ADMIN — FENTANYL CITRATE 50 MCG: 50 INJECTION, SOLUTION INTRAMUSCULAR; INTRAVENOUS at 10:56

## 2023-03-30 RX ADMIN — CEFAZOLIN 2000 MG: 2 INJECTION, POWDER, FOR SOLUTION INTRAMUSCULAR; INTRAVENOUS at 10:29

## 2023-03-30 RX ADMIN — ONDANSETRON 4 MG: 2 INJECTION INTRAMUSCULAR; INTRAVENOUS at 11:34

## 2023-03-30 RX ADMIN — ROCURONIUM BROMIDE 20 MG: 10 INJECTION, SOLUTION INTRAVENOUS at 10:56

## 2023-03-30 RX ADMIN — DEXAMETHASONE SODIUM PHOSPHATE 10 MG: 10 INJECTION INTRAMUSCULAR; INTRAVENOUS at 10:27

## 2023-03-30 RX ADMIN — SODIUM CHLORIDE: 9 INJECTION, SOLUTION INTRAVENOUS at 08:58

## 2023-03-30 RX ADMIN — SODIUM CHLORIDE: 9 INJECTION, SOLUTION INTRAVENOUS at 10:20

## 2023-03-30 RX ADMIN — MIDAZOLAM 2 MG: 1 INJECTION INTRAMUSCULAR; INTRAVENOUS at 09:46

## 2023-03-30 RX ADMIN — SODIUM CHLORIDE: 9 INJECTION, SOLUTION INTRAVENOUS at 11:01

## 2023-03-30 RX ADMIN — FENTANYL CITRATE 50 MCG: 50 INJECTION, SOLUTION INTRAMUSCULAR; INTRAVENOUS at 11:28

## 2023-03-30 ASSESSMENT — PAIN DESCRIPTION - DESCRIPTORS: DESCRIPTORS: ACHING;SORE

## 2023-03-30 ASSESSMENT — LIFESTYLE VARIABLES: SMOKING_STATUS: 1

## 2023-03-30 ASSESSMENT — PAIN - FUNCTIONAL ASSESSMENT: PAIN_FUNCTIONAL_ASSESSMENT: 0-10

## 2023-03-30 NOTE — ANESTHESIA PROCEDURE NOTES
Peripheral Block    Patient location during procedure: procedure area  Reason for block: post-op pain management and at surgeon's request  Start time: 3/30/2023 9:45 AM  End time: 3/30/2023 9:58 AM  Staffing  Performed: anesthesiologist   Anesthesiologist: Scott Walker DO  Preanesthetic Checklist  Completed: patient identified, IV checked, site marked, risks and benefits discussed, surgical/procedural consents, equipment checked, pre-op evaluation, timeout performed, anesthesia consent given, oxygen available, monitors applied/VS acknowledged, fire risk safety assessment completed and verbalized and blood product R/B/A discussed and consented  Peripheral Block   Patient position: supine  Prep: ChloraPrep  Provider prep: mask and sterile gloves  Patient monitoring: cardiac monitor, continuous pulse ox, frequent blood pressure checks, IV access, responsive to questions and oxygen  Block type: Sciatic  Popliteal  Laterality: right  Injection technique: single-shot  Guidance: nerve stimulator and ultrasound guided  Local infiltration: ropivacaine  Infiltration strength: 0.5 %  Local infiltration: ropivacaine  Dose: 30 mL    Needle   Needle type: insulated echogenic nerve stimulator needle (Stimuplex)   Needle gauge: 20 G  Needle localization: nerve stimulator and ultrasound guidance  Needle length: 10 cm  Assessment   Injection assessment: negative aspiration for heme, no paresthesia on injection and local visualized surrounding nerve on ultrasound  Slow fractionated injection: yes  Hemodynamics: stable  Real-time US image taken/store: yes  Outcomes: uncomplicated and patient tolerated procedure well    Additional Notes  Block # 2 Right adductor canal.  Ultrasound guidance, good spread of local, image taken. 0.5% Ropiv 10 cc with incremental aspiration. No difficulty.

## 2023-03-30 NOTE — ANESTHESIA POSTPROCEDURE EVALUATION
Department of Anesthesiology  Postprocedure Note    Patient: Ascencion Bumpers  MRN: [id number]  YOB: 1980  Date of evaluation: 3/30/2023      Procedure Summary     Date: 03/30/23 Room / Location: [facility] OR 09 / [facility]    Anesthesia Start: 1005 Anesthesia Stop: 1149    Procedure: RIGHT ANKLE OPEN REDUCTION INTERNAL FIXATION, SYNDESMOSIS REPAIR OF RIGHT ANKLE (Right: Leg Lower) Diagnosis:       Closed bimalleolar fracture of right ankle      (Closed bimalleolar fracture of right ankle [Q45.339L])    Surgeons: Kailash Burden MD Responsible Provider: Debora Dukes DO    Anesthesia Type: General, Regional ASA Status: 3          Anesthesia Type: General, Regional    Adi Phase I: Adi Score: 10    Adi Phase II: Adi Score: 10      Anesthesia Post Evaluation    Patient location during evaluation: PACU  Patient participation: complete - patient participated  Level of consciousness: awake and alert  Pain score: 1  Airway patency: patent  Nausea & Vomiting: no nausea and no vomiting  Complications: no  Cardiovascular status: hemodynamically stable  Respiratory status: acceptable  Hydration status: euvolemic

## 2023-03-30 NOTE — INTERVAL H&P NOTE
Update History & Physical    The patient's History and Physical of March 29, 2023 was reviewed with the patient and I examined the patient. There was no change. The surgical site was confirmed by the patient and me. Plan: The risks, benefits, expected outcome, and alternative to the recommended procedure have been discussed with the patient. Patient understands and wants to proceed with the procedure.      Electronically signed by Kerrie Rowan MD on 3/30/2023 at 9:30 AM

## 2023-03-30 NOTE — OP NOTE
Operative Note      Patient: Jolene Javed  YOB: 1980  MRN: [id number]    Date of Procedure: 3/30/2023    Pre-Op Diagnosis: Closed bimalleolar fracture of right ankle [S82.841A]    Post-Op Diagnosis:  Closed fracture right lateral malleolus with disruption of the syndesmosis right ankle       Procedure(s):  RIGHT ANKLE OPEN REDUCTION INTERNAL FIXATION, SYNDESMOSIS REPAIR OF RIGHT ANKLE procedure:  1. Open reduction internal fixation right lateral malleolus fracture    2. Stress examination under fluoroscopy with anesthesia of right ankle    3. Open treatment right ankle syndesmosis with repair    Surgeon(s):  Phan Rivera MD    Assistant:   Resident: Jame Vivar DO; Shravan Delgado DO    Anesthesia: General    Estimated Blood Loss (mL): Minimal    Complications: None    Specimens:   * No specimens in log *    Implants:  * No implants in log *      Drains: * No LDAs found *    Findings: Mild to moderate laxity in the syndesmosis of the right ankle on stress testing. Utilized a Diana lateral malleolus plate and a Synchfix for syndesmotic repair. Detailed Description of Procedure:   Patient was brought to the operating room in a supine position on hospital room bed. Patient was transferred to the operating room table by multiple individuals in the safe fashion with anesthesia and control the patient C-spine area. Once in the operating room table all points pressure identified and well padded. A tourniquet is applied to his right upper thigh. His right lower extremity sterilely prepped and draped in the standard orthopedic fashion. A timeout was performed indicating the appropriate identification of the patient, the procedure to be performed, and the side to be performed upon. This was agreed upon by all individuals in the room. After the timeout incision was marked out for the subcutaneous approach to the right fibula.   An Esmarch was applied and tourniquet was help with wound healing along with prescription for vitamin C and zinc    3. Follow-up in office in 2 weeks for x-rays of the right ankle with potential transition to a Velcro boot most likely nonweightbearing for 8 weeks postoperative depending on his serial x-rays.     Electronically signed by Mikel Jaramillo MD on 3/30/2023 at 11:11 AM

## 2023-03-30 NOTE — ANESTHESIA PRE PROCEDURE
enlargement  Borderline ECG  No previous ECGs available  Confirmed by Barrington Flaherty ([id number]) on 3/19/2023 11:52:05 AM     Anesthesia Plan      general     ASA 3     (Pt endorsed understanding of risks, benefits, and potential compliations with intubation and airway instrumentation. Plan for LMA w/ PNB or ETT/GA w/ Ketamine if declines PNBs. Discussed with pt. R & B of Rt. Popliteal fossa and Rt. Adductor canal nerve blocks and pt. Agrees to this.)  Induction: intravenous. MIPS: Prophylactic antiemetics administered. Anesthetic plan and risks discussed with patient. Use of blood products discussed with patient whom consented to blood products. Plan discussed with CRNA. Post-op pain plan if not by surgeon: sarah Lane RN   3/30/2023        Patient seen and examined, chart reviewed, agree with above findings. Anesthetic plan, risks, benefits, alternatives, and personnel involved discussed with patient. Patient verbalized an understanding and agreed to proceed. NPO status confirmed. Anesthetic plan discussed with care team members and agreed upon.     Heide Ramirez DO   3/30/2023  9:38 AM

## 2023-03-30 NOTE — DISCHARGE INSTRUCTIONS
[facility]) Department of Orthopedic Surgery  [address] MD Dr. Dreama Gurney, MD Nidia Parkins, PA-C Cheral Serene PA-C Elston Germany PA-C      Orthopaedics Discharge Instructions   Weight bearing Status - Non-weight bearing - on right lower Extremity  Pain medication Per Prescriptions  Contact Office for Medication Refill- [phone]  Office can refill pain med every 7 days  If patient discharging to facility then pain control will be continued per facility physician  Ice to operative/injured site for 15-30 minutes of each hour for next 5 days    Recommend that you continue to ice the area 2-3 times per day after this   Elevate operative/injured limb on 2 pillows at home  Goal is to have limb above the heart if able  Continue DVT Prophylaxis (blood clot prevention) as Prescribed: Aspirin 325 mg twice daily  Wound care -keep your splint in place. Dressings will be changed at your postop appointment    Follow Up in Office in 2 weeks. Your first post op appointment is often with one of our PAs. Call the office at [phone] or directions or with any questions. Watch for these significant complications. Call physician if they or any other problems occur:  Fever over 101°, redness, swelling or warmth at the operative site  Unrelieved nausea    Foul smelling or cloudy drainage at the operative site   Unrelieved pain    Blood soaked dressing. (Some oozing may be normal)     Numb, pale, blue, cold or tingling extremity    Future Appointments   Date Time Provider Emil Sneed   4/17/2023 12:45 AM SCHEDULE, SE [facility]         It is the Department of Orthopaedic Trauma's standard of practice that providers will de-escalate(wean) all patients from narcotic(opioid) medications during the post-operative period.    We provide multimodal pain control but opioid medications are tapered in all of our

## 2023-03-31 RX ORDER — METHOCARBAMOL 750 MG/1
750 TABLET, FILM COATED ORAL 4 TIMES DAILY PRN
Qty: 40 TABLET | Refills: 0 | Status: SHIPPED | OUTPATIENT
Start: 2023-03-31 | End: 2023-04-10

## 2023-03-31 RX ORDER — GABAPENTIN 100 MG/1
200 CAPSULE ORAL 3 TIMES DAILY
Qty: 42 CAPSULE | Refills: 0 | Status: SHIPPED | OUTPATIENT
Start: 2023-03-31 | End: 2023-04-07

## 2023-04-01 NOTE — PROGRESS NOTES
Patient admitted to [facility] & placed on appropriate monitors. Cart low, locked with siderails up. Call light within reach. Patient sitting up in bed taking PO. Belongings returned. Calling for a ride.
Patient contacted after hours provider due to increased pain starting today after nerve block from surgery 3/30/23. Shooting pain from ankle up to the thigh where block completed. Multimodal pain control written particularly gabapentin to target rebound nerve pain, patient OK to use heat away from surgical site, ice over the splint.  OK to start with NSAIDs as well  Electronically signed by Stefani Hernandez PA-C on 3/31/2023 at 8:26 PM
Xrays taken per order.
ramp and use the [employer]. EDUCATION INSTRUCTIONS:          [x] Pain: Post-op pain is normal and to be expected. You will be asked to rate your pain from 0-10. [x] Ajith 77 placed in chart. MEDICATION INSTRUCTIONS:        [x] May take the morning of surgery if needed with a sip of water: Percocet    [x] Stop all herbal supplements and vitamins 5 days before surgery. Stop ibuprofen (NSAIDS) 7 days before surgery. [x] Follow physician instructions regarding any blood thinners you may be taking. WHAT TO EXPECT:    [x] The day of surgery you will be greeted and checked in by the [employer].  In addition, you will be registered in the [place] by a Patient Access Representative. Please bring your photo ID and insurance card. A nurse will greet you in accordance to the time you are needed in the pre-op area to prepare you for surgery. Please do not be discouraged if you are not greeted in the order you arrive as there are many variables that are involved in patient preparation. Your patience is greatly appreciated as you wait for your nurse. Please bring in items such as: books, magazines, newspapers, electronics, or any other items  to occupy your time in the waiting area. [x]  Delays may occur with surgery and staff will make a sincere effort to keep you informed of delays. If any delays occur with your procedure, we apologize ahead of time for your inconvenience as we recognize the value of your time.

## 2023-04-03 DIAGNOSIS — T14.8XXA FRACTURE: ICD-10-CM

## 2023-04-03 RX ORDER — OXYCODONE HYDROCHLORIDE AND ACETAMINOPHEN 5; 325 MG/1; MG/1
1 TABLET ORAL EVERY 6 HOURS PRN
Qty: 28 TABLET | Refills: 0 | Status: SHIPPED | OUTPATIENT
Start: 2023-04-05 | End: 2023-04-12

## 2023-04-03 NOTE — TELEPHONE ENCOUNTER
Call placed to patient at this time. Informed patient that medication was sent to pharmacy for fill on Wednesday 4/5/2023. Patient verbalized understanding.     Future Appointments   Date Time Provider Emil Sneed   4/17/2023 12:45 PM SCHEDULE, SE [facility]

## 2023-04-03 NOTE — TELEPHONE ENCOUNTER
Percocet refilled. First fill date 4/5    Controlled Substance Monitoring:    Acute and Chronic Pain Monitoring:   RX Monitoring 4/3/2023   Periodic Controlled Substance Monitoring No signs of potential drug abuse or diversion identified.

## 2023-04-03 NOTE — TELEPHONE ENCOUNTER
Received call from patient requesting refill of Percocet. Date of Procedure: 3/30/2023   Procedure(s):  RIGHT ANKLE OPEN REDUCTION INTERNAL FIXATION, SYNDESMOSIS REPAIR OF RIGHT ANKLE procedure:  1. Open reduction internal fixation right lateral malleolus fracture  2. Stress examination under fluoroscopy with anesthesia of right ankle  3. Open treatment right ankle syndesmosis with repair     Surgeon(s):  Renu Lim MD    Last OV: 3/29/2023    Medication pended and routed to providers for decision and signature.     Future Appointments   Date Time Provider Emil Sneed   4/17/2023 12:45 PM SCHEDULE, [facility]

## 2023-04-17 ENCOUNTER — HOSPITAL ENCOUNTER (OUTPATIENT)
Dept: GENERAL RADIOLOGY | Age: 43
Discharge: HOME | End: 2023-04-19
Payer: COMMERCIAL

## 2023-04-17 ENCOUNTER — OFFICE VISIT (OUTPATIENT)
Dept: ORTHOPEDIC SURGERY | Age: 43
End: 2023-04-17
Payer: COMMERCIAL

## 2023-04-17 DIAGNOSIS — S82.841D CLOSED BIMALLEOLAR FRACTURE OF RIGHT ANKLE WITH ROUTINE HEALING, SUBSEQUENT ENCOUNTER: ICD-10-CM

## 2023-04-17 DIAGNOSIS — Z09 FOLLOW-UP EXAM: Primary | ICD-10-CM

## 2023-04-17 DIAGNOSIS — Z09 FOLLOW-UP EXAM: ICD-10-CM

## 2023-04-17 PROCEDURE — 99024 POSTOP FOLLOW-UP VISIT: CPT | Performed by: PHYSICIAN ASSISTANT

## 2023-04-17 PROCEDURE — 73610 X-RAY EXAM OF ANKLE: CPT

## 2023-04-17 PROCEDURE — L4361 PNEUMA/VAC WALK BOOT PRE OTS: HCPCS

## 2023-04-17 PROCEDURE — 99213 OFFICE O/P EST LOW 20 MIN: CPT

## 2023-04-17 RX ORDER — METHOCARBAMOL 750 MG/1
750 TABLET, FILM COATED ORAL 3 TIMES DAILY
Qty: 90 TABLET | Refills: 1 | Status: SHIPPED | OUTPATIENT
Start: 2023-04-17 | End: 2023-06-16

## 2023-04-17 RX ORDER — MELOXICAM 15 MG/1
15 TABLET ORAL DAILY
COMMUNITY
Start: 2023-03-28 | End: 2023-04-17

## 2023-04-17 RX ORDER — ASPIRIN 325 MG
325 TABLET, DELAYED RELEASE (ENTERIC COATED) ORAL 2 TIMES DAILY
Qty: 60 TABLET | Refills: 1 | Status: SHIPPED | OUTPATIENT
Start: 2023-04-17

## 2023-04-17 RX ORDER — ESCITALOPRAM OXALATE 20 MG/1
20 TABLET ORAL DAILY
COMMUNITY
Start: 2023-03-28

## 2023-04-17 NOTE — PROGRESS NOTES
Chief Complaint   Patient presents with    Post-Op Check     2 wk post op R ankle ORIF 03/30/23; TTS  Patient states he is having a lot of pain in the inside of ankle area. He states he is up on his feet 5-6 hours a day. He notes swelling in his toes. Splint intact. OP:SURGEON: Dr. Monty Carmona MD  DATE OF PROCEDURE: 3/30/23  PROCEDURE:RIGHT ANKLE OPEN REDUCTION INTERNAL FIXATION, SYNDESMOSIS REPAIR OF RIGHT ANKLE procedure:  1. Open reduction internal fixation right lateral malleolus fracture     2. Stress examination under fluoroscopy with anesthesia of right ankle     3. Open treatment right ankle syndesmosis with repair    Subjective:  Daysi Sykes is approximately 3 weeks from the above surgery. He has been nonweightbearing in the splint, right lower extremity using crutches for ambulation. Still taking aspirin twice daily for DVT prophylaxis. Pain is mild to moderate mostly over the lateral ankle with moderate amount of edema. Pain is worse at night. Taking Percocet sometimes more than every 6 hours for unrelieved pain. Not taking over-the-counter analgesics. Denies calf pain, CP, SOB, fever, chills, malaise. Review of Systems -  all pertinent positives and negatives in HPI. Objective:    General: Alert and oriented X 3, normocephalic atraumatic, external ears and eye normal, sclera clear, no acute distress, respirations easy and unlabored with no audible wheezes, skin warm and dry, speech and dress appropriate for noted age, affect euthymic. Extremity:  Right Lower Extremity  Skin clean dry and intact, without signs of infection  Incisions well approximated without signs of redness, warmth or drainage- sutures intact  Moderate diffuse foot and ankle edema noted  Compartments supple throughout thigh and leg  Calf supple and nontender  Demonstrates active knee flexion/extension, ankle plantar/dorsiflexion/great toe extension.    States sensation intact to touch in

## 2023-04-18 DIAGNOSIS — T14.8XXA FRACTURE: ICD-10-CM

## 2023-04-18 NOTE — TELEPHONE ENCOUNTER
Received call from patient requesting ref[facility] Date of Procedure: 3/30/2023   Procedure(s):  RIGHT ANKLE OPEN REDUCTION INTERNAL FIXATION, SYNDESMOSIS REPAIR OF RIGHT ANKLE procedure:  1. Open reduction internal fixation right lateral malleolus fracture  2. Stress examination under fluoroscopy with anesthesia of right ankle  3. Open treatment right ankle syndesmosis with repair     Surgeon(s):  Sulema Grande MD    Last OV: 4/17/2023    Medication pended and routed to providers for decision and signature.     Future Appointments   Date Time Provider Emil Sneed   5/17/2023 10:45 AM Sulema Grande MD  [facility]

## 2023-04-19 RX ORDER — OXYCODONE HYDROCHLORIDE AND ACETAMINOPHEN 5; 325 MG/1; MG/1
1 TABLET ORAL EVERY 8 HOURS PRN
Qty: 21 TABLET | Refills: 0 | Status: SHIPPED | OUTPATIENT
Start: 2023-04-19 | End: 2023-04-26

## 2023-04-19 NOTE — TELEPHONE ENCOUNTER
Samia Monet is 3 weeks from the following Surgery:    Date of Procedure: 3/30/2023   Procedure(s):  RIGHT ANKLE OPEN REDUCTION INTERNAL FIXATION, SYNDESMOSIS REPAIR OF RIGHT ANKLE procedure:  1. Open reduction internal fixation right lateral malleolus fracture  2. Stress examination under fluoroscopy with anesthesia of right ankle  3. Open treatment right ankle syndesmosis with repair    OARRS report reviewed  Last RX filled on 4/11/2023 for Q6H PRN  Plan for wean: weaned to every 8 hours with this RX, maintain at every 8 with next refill if needed  Continue multimodal pain control, additional tylenol OK, has robaxin, if nerve pain can add back gabapentin    Controlled Substance Monitoring:    Acute and Chronic Pain Monitoring:   RX Monitoring 4/19/2023   Periodic Controlled Substance Monitoring No signs of potential drug abuse or diversion identified.         Electronically signed by Conor Pulido PA-C on 4/19/2023 at 12:52 PM

## 2023-04-19 NOTE — TELEPHONE ENCOUNTER
Patient called today to check on status of medication refill request that was left yesterday (4/18/2023). Medication pended and routed to providers for decision and signature.     Future Appointments   Date Time Provider Emil Nedra   5/17/2023 10:45 AM Delio Tejada MD [facility]

## 2023-04-26 DIAGNOSIS — T14.8XXA FRACTURE: ICD-10-CM

## 2023-04-26 RX ORDER — OXYCODONE HYDROCHLORIDE AND ACETAMINOPHEN 5; 325 MG/1; MG/1
1 TABLET ORAL 2 TIMES DAILY PRN
Qty: 14 TABLET | Refills: 0 | Status: SHIPPED | OUTPATIENT
Start: 2023-04-26 | End: 2023-05-03

## 2023-04-26 NOTE — TELEPHONE ENCOUNTER
Received call from patient requesting ref[facility] Date of Procedure: 3/30/2023   Procedure(s):  RIGHT ANKLE OPEN REDUCTION INTERNAL FIXATION, SYNDESMOSIS REPAIR OF RIGHT ANKLE procedure:  1. Open reduction internal fixation right lateral malleolus fracture  2. Stress examination under fluoroscopy with anesthesia of right ankle  3. Open treatment right ankle syndesmosis with repair     Surgeon(s):  Jalil Aviles MD     Last OV: 4/17/2023     Medication pended and routed to providers for decision and signature.             Future Appointments   Date Time Provider Emil Sneed   5/17/2023 10:45 AM Jalil Aviles MD  [facility]

## 2023-04-26 NOTE — TELEPHONE ENCOUNTER
Percocet refilled and weaned to BID PRN. Controlled Substance Monitoring:    Acute and Chronic Pain Monitoring:   RX Monitoring 4/26/2023   Periodic Controlled Substance Monitoring No signs of potential drug abuse or diversion identified.

## 2023-05-02 ENCOUNTER — TELEPHONE (OUTPATIENT)
Dept: ORTHOPEDIC SURGERY | Age: 43
End: 2023-05-02

## 2023-05-03 DIAGNOSIS — M25.571 CHRONIC PAIN OF RIGHT ANKLE: Primary | ICD-10-CM

## 2023-05-03 DIAGNOSIS — G89.29 CHRONIC PAIN OF RIGHT ANKLE: Primary | ICD-10-CM

## 2023-05-03 DIAGNOSIS — T14.8XXA FRACTURE: ICD-10-CM

## 2023-05-03 RX ORDER — OXYCODONE HYDROCHLORIDE AND ACETAMINOPHEN 5; 325 MG/1; MG/1
1 TABLET ORAL DAILY PRN
Qty: 7 TABLET | Refills: 0 | Status: SHIPPED | OUTPATIENT
Start: 2023-05-03 | End: 2023-05-10

## 2023-05-03 NOTE — TELEPHONE ENCOUNTER
Call placed to patient at this time to inform that refill was sent and weaned to once daily, as final refill. Patient verbalized understanding however he is concerned due to the amount of pain he is still having. Offered referral to pain management to patient. Patient agreeable to this. Patient also states he will discuss with his PCP as well. Referral pended and routed to providers for decision and signature.     Future Appointments   Date Time Provider Emil Sneed   5/17/2023 10:45 AM Claribel Mckeon MD [facility]

## 2023-05-03 NOTE — TELEPHONE ENCOUNTER
Controlled Substance Monitoring:    Acute and Chronic Pain Monitoring:   RX Monitoring 5/3/2023   Periodic Controlled Substance Monitoring No signs of potential drug abuse or diversion identified.

## 2023-05-03 NOTE — TELEPHONE ENCOUNTER
Received call from patient requesting ref[facility]. Date of Procedure: 3/30/2023   Procedure(s):  RIGHT ANKLE OPEN REDUCTION INTERNAL FIXATION, SYNDESMOSIS REPAIR OF RIGHT ANKLE procedure:  1. Open reduction internal fixation right lateral malleolus fracture  2. Stress examination under fluoroscopy with anesthesia of right ankle  3. Open treatment right ankle syndesmosis with repair     Surgeon(s):  Sam Garcia MD     Last OV: 4/17/2023     Medication pended and routed to providers for decision and signature.                  Future Appointments   Date Time Provider Emil Sneed   5/17/2023 10:45 AM Sam Garcia MD  [facility]

## 2023-05-03 NOTE — TELEPHONE ENCOUNTER
Orders signed. Please see attached. Thank you.   Electronically signed by Medina Mares PA-C on 5/3/2023 at 3:25 PM

## 2023-05-17 ENCOUNTER — HOSPITAL ENCOUNTER (OUTPATIENT)
Dept: GENERAL RADIOLOGY | Age: 43
Discharge: HOME | End: 2023-05-19
Payer: COMMERCIAL

## 2023-05-17 ENCOUNTER — OFFICE VISIT (OUTPATIENT)
Dept: ORTHOPEDIC SURGERY | Age: 43
End: 2023-05-17
Payer: COMMERCIAL

## 2023-05-17 VITALS — BODY MASS INDEX: 33.21 KG/M2 | WEIGHT: 232 LBS | HEIGHT: 70 IN

## 2023-05-17 DIAGNOSIS — S82.841D CLOSED BIMALLEOLAR FRACTURE OF RIGHT ANKLE WITH ROUTINE HEALING, SUBSEQUENT ENCOUNTER: ICD-10-CM

## 2023-05-17 DIAGNOSIS — S82.841D CLOSED BIMALLEOLAR FRACTURE OF RIGHT ANKLE WITH ROUTINE HEALING, SUBSEQUENT ENCOUNTER: Primary | ICD-10-CM

## 2023-05-17 PROCEDURE — 99213 OFFICE O/P EST LOW 20 MIN: CPT

## 2023-05-17 PROCEDURE — 99024 POSTOP FOLLOW-UP VISIT: CPT | Performed by: ORTHOPAEDIC SURGERY

## 2023-05-17 PROCEDURE — 73610 X-RAY EXAM OF ANKLE: CPT

## 2023-05-17 RX ORDER — HYDROCODONE BITARTRATE AND ACETAMINOPHEN 5; 325 MG/1; MG/1
1 TABLET ORAL DAILY PRN
Qty: 7 TABLET | Refills: 0 | Status: SHIPPED | OUTPATIENT
Start: 2023-05-17 | End: 2023-05-24

## 2023-05-17 NOTE — PROGRESS NOTES
Chief Complaint   Patient presents with    Post-Op Check     6 week po 03/30/23 rt ankle ORIF       SUBJECTIVE: Patient seen today 6 weeks out from open reduction internal fixation right ankle fracture with syndesmotic repair. Patient is doing fairly well. He has previous right foot crush injury with recent surgeries multiple to the right foot. He does still have right foot swelling. He states his pain is fairly well controlled although he still needs narcotic medication therefore we have referred him to pain management. He denies any recent injury. He does state that he stands on his foot a little bit when he is going to the bathroom and states he is not having any significant pain. Past Medical History:   Diagnosis Date    COVID     Feb. 2023       Past Surgical History:   Procedure Laterality Date    ANKLE FRACTURE SURGERY Right 3/30/2023    RIGHT ANKLE OPEN REDUCTION INTERNAL FIXATION, SYNDESMOSIS REPAIR OF RIGHT ANKLE performed by Renu Brown MD at [facility] Right     Plates and screw right 2nd and 5th metatarsal   approx 2020    TOE SURGERY Right 05/04/2022    Great toe- plate with screws, Removal of plates of 2nd and 5th metatarsal bones       History reviewed. No pertinent family history. Social History     Tobacco Use    Smoking status: Every Day     Packs/day: 0.50     Types: Cigarettes    Smokeless tobacco: Never   Vaping Use    Vaping Use: Never used   Substance Use Topics    Alcohol use:  Yes     Alcohol/week: 10.0 standard drinks     Types: 10 Cans of beer per week    Drug use: No       No Known Allergies      Review of Systems -   General ROS: negative for - chills, fatigue, fever or night sweats  Respiratory ROS: no cough, shortness of breath, or wheezing  Cardiovascular ROS: no chest pain or dyspnea on exertion  Gastrointestinal ROS: no abdominal pain, change in bowel habits, or black or bloody stools  Genitourinary: no hematuria, dysuria, or incontinence

## 2023-05-17 NOTE — PATIENT INSTRUCTIONS
Norco prescription given    Voltaren prescription given    Start physical therapy weightbearing as tolerated on June 5, 2023    Follow-up in 6 weeks, call sooner with any questions or concerns

## 2023-06-09 ENCOUNTER — HOSPITAL ENCOUNTER (OUTPATIENT)
Dept: PHYSICAL THERAPY | Age: 43
Setting detail: THERAPIES SERIES
Discharge: HOME | End: 2023-06-09
Payer: COMMERCIAL

## 2023-06-09 PROCEDURE — 97161 PT EVAL LOW COMPLEX 20 MIN: CPT

## 2023-06-09 NOTE — PROGRESS NOTES
[address] [facility]                Phone: [phone]   Fax: [phone]    Physical Therapy Daily Treatment Note  Date:  2023    Patient Name:  Marilu Link    :  1980  MRN: [id number]    Restrictions/Precautions:  Velcro boot use, Progressive WBAT  Diagnosis:  Closed Chato RT ankle Fx with RIGHT ANKLE OPEN REDUCTION INTERNAL FIXATION, SYNDESMOSIS REPAIR OF RIGHT ANKLE   Insurance/Certification information:  [employer] [place] [employer]  Referring Physician:  Dr Darling Danielson MD  Visit# / total visits:  -  Pain level: 4-8/10 RT ankle   Time In:  Time Out:    Subjective:      Exercises:  Exercise/Equipment Resistance/Repetitions Other comments                                                                                                                                             Other Therapeutic Activities:      Home Exercise Program:      Manual Treatments:      Modalities:      Treatment/Activity Tolerance:  [] Patient tolerated treatment well [] Patient limited by fatique  [] Patient limited by pain  [] Patient limited by other medical complications  [] Other:       Plan:   [] Continue per plan of care [] Alter current plan (see comments)  [] Plan of care initiated [] Hold pending MD visit [] Discharge      Electronically signed by:  Heriberto Gutierrez PT
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
## 2023-06-16 ENCOUNTER — APPOINTMENT (OUTPATIENT)
Dept: PHYSICAL THERAPY | Age: 43
End: 2023-06-16
Payer: COMMERCIAL

## 2023-06-19 ENCOUNTER — HOSPITAL ENCOUNTER (OUTPATIENT)
Dept: PHYSICAL THERAPY | Age: 43
Setting detail: THERAPIES SERIES
Discharge: HOME | End: 2023-06-19
Payer: COMMERCIAL

## 2023-06-19 NOTE — PROGRESS NOTES
[address] [facility]                Phone: [phone]  Fax: [phone]    Physical Therapy  Cancellation/No-show Note  Patient Name:  Leila Valentino  :  1980   Date:  2023    For today's appointment patient:  [x]  Cancelled  []  Rescheduled appointment  []  No-show     Reason given by patient:  []  Patient ill  []  Conflicting appointment  []  No transportation    []  Conflict with work  []  No reason given  []  Other:     Comments:      Electronically signed by:  Brittny Short, PT

## 2023-06-21 ENCOUNTER — HOSPITAL ENCOUNTER (OUTPATIENT)
Dept: PHYSICAL THERAPY | Age: 43
Setting detail: THERAPIES SERIES
Discharge: HOME | End: 2023-06-21
Payer: COMMERCIAL

## 2023-06-21 PROCEDURE — 97110 THERAPEUTIC EXERCISES: CPT

## 2023-06-21 NOTE — PROGRESS NOTES
[address] [facility]                Phone: [phone]   Fax: [phone]    Physical Therapy Daily Treatment Note  Date:  2023    Patient Name:  Kelly Franco    :  1980  MRN: [id number]    Restrictions/Precautions:  Velcro boot use, Progressive WBAT  Diagnosis:  Closed Chato RT ankle Fx with RIGHT ANKLE OPEN REDUCTION INTERNAL FIXATION, SYNDESMOSIS REPAIR OF RIGHT ANKLE   Insurance/Certification information:  [employer] [place] [employer]  Referring Physician:  Dr Sulema Grande MD  Visit# / total visits:  -  Pain level: -8/10 RT ankle   Time In: 8:37  Time Out: 9:38    Subjective:  Reports walks with boot AAT but on some few occasions when in bed and needs to use RR. States is very careful when boot off. Has fused 1st ray/big toe fusion from prior foot Sx x 2 prior to this Fx and Sx. Had baseline no ROM big toe and minimal DF/PF and no toe mvmts big toe. Also has baseline pain with attempted and active foot/toe loading and mvmts. 23: slight inc pain on Monday as called off here 2/2 did a bunch yard work weekend and ankle pain inc too much. Doing HEP as [address] Dr appt. 23. O: There-ex/grid  Cam boot mobility WBAT    A/P: Highly engaged and motivated. Some mild inc pain or stiffness with ROM ex. Inst to gentle ROM only at this point. Prior Sx's and impaired ROM will always limit ROM and he understands this.         Exercises:  Exercise/Equipment Resistance/Repetitions Other comments   Onestep  X 10'  Removed boot to bike,    RT DF stretch with GB   RT foot over square footrest x 15 @ 15 sec holds Inc pain RT foot, inst to gentle ROM only   Alphabet    X 3  F+ ROM, gentle  ROM   Ankle circles    60 x CW and 60x CCW F+ ROM, vladimir ROM    PF with TB   Gr 3 x 10  HEP   DF with TB   Gr 3 x 10  HEP   Seated windshield wipers   40 x seated  Reports tight, min inc pain     SAQ  7# CW @ 4 x 10  No Diff   LAQ   7# CW @ 5 x 10  No diff   RT

## 2023-06-23 ENCOUNTER — HOSPITAL ENCOUNTER (OUTPATIENT)
Dept: PHYSICAL THERAPY | Age: 43
Setting detail: THERAPIES SERIES
Discharge: HOME | End: 2023-06-23
Payer: COMMERCIAL

## 2023-06-23 PROCEDURE — 97110 THERAPEUTIC EXERCISES: CPT

## 2023-06-23 PROCEDURE — 97530 THERAPEUTIC ACTIVITIES: CPT

## 2023-06-23 NOTE — PROGRESS NOTES
No Diff   Stand knee curls   7# 4 x 10  No diff   Stool scoots for RT HS MM/knee flex MMT       Sitting foam ankle ROM/gentle proprioception   4 x 25 small loading ROM only  Stiffness expressed. Rocker board standing Side-side and FWD-back @ 4 sets of 40 ea Highly coordin, no inc pain either directions.     Baps board sitting  # 2 ball @ 4 sets of 20 CW edge touch  Able to do with good coordin, still reported, no real inc pain   T-mill unilat heel-toe gait train      Total gym  L-9 @ 4 x 10  Only feeling in quads, no RT ankle pain, RT ankle DF ROM with squat w/o pain                   Other Therapeutic Activities:      Home Exercise Program:  Provided with Gr TB to follow/HEP    Manual Treatments: NA     Modalities:  NA    Treatment/Activity Tolerance:  [] Patient tolerated treatment well [] Patient limited by fatique  [x] Patient limited by pain/ROM loss  [] Patient limited by other medical complications  [x] Other: Prior RT foot Sx's      Plan:   [x] Continue per plan of care [] Alter current plan (see comments)  [] Plan of care initiated [] Hold pending MD visit [] Discharge      Electronically signed by:  Deshaun Son PT

## 2023-06-26 DIAGNOSIS — S82.841D CLOSED BIMALLEOLAR FRACTURE OF RIGHT ANKLE WITH ROUTINE HEALING, SUBSEQUENT ENCOUNTER: Primary | ICD-10-CM

## 2023-06-28 ENCOUNTER — OFFICE VISIT (OUTPATIENT)
Dept: ORTHOPEDIC SURGERY | Age: 43
End: 2023-06-28
Payer: COMMERCIAL

## 2023-06-28 ENCOUNTER — HOSPITAL ENCOUNTER (OUTPATIENT)
Dept: GENERAL RADIOLOGY | Age: 43
Discharge: HOME | End: 2023-06-30
Payer: COMMERCIAL

## 2023-06-28 ENCOUNTER — HOSPITAL ENCOUNTER (OUTPATIENT)
Dept: PHYSICAL THERAPY | Age: 43
Setting detail: THERAPIES SERIES
Discharge: HOME | End: 2023-06-28
Payer: COMMERCIAL

## 2023-06-28 DIAGNOSIS — S82.841D CLOSED BIMALLEOLAR FRACTURE OF RIGHT ANKLE WITH ROUTINE HEALING, SUBSEQUENT ENCOUNTER: Primary | ICD-10-CM

## 2023-06-28 DIAGNOSIS — S82.841D CLOSED BIMALLEOLAR FRACTURE OF RIGHT ANKLE WITH ROUTINE HEALING, SUBSEQUENT ENCOUNTER: ICD-10-CM

## 2023-06-28 PROCEDURE — 99024 POSTOP FOLLOW-UP VISIT: CPT | Performed by: PHYSICIAN ASSISTANT

## 2023-06-28 PROCEDURE — 99213 OFFICE O/P EST LOW 20 MIN: CPT

## 2023-06-28 PROCEDURE — 73610 X-RAY EXAM OF ANKLE: CPT

## 2023-06-30 ENCOUNTER — HOSPITAL ENCOUNTER (OUTPATIENT)
Dept: PHYSICAL THERAPY | Age: 43
Setting detail: THERAPIES SERIES
End: 2023-06-30
Payer: COMMERCIAL

## 2023-07-03 ENCOUNTER — APPOINTMENT (OUTPATIENT)
Dept: PHYSICAL THERAPY | Age: 43
End: 2023-07-03
Payer: COMMERCIAL

## 2023-07-05 ENCOUNTER — APPOINTMENT (OUTPATIENT)
Dept: PHYSICAL THERAPY | Age: 43
End: 2023-07-05
Payer: COMMERCIAL

## 2023-07-07 ENCOUNTER — APPOINTMENT (OUTPATIENT)
Dept: PHYSICAL THERAPY | Age: 43
End: 2023-07-07
Payer: COMMERCIAL

## 2023-07-10 ENCOUNTER — HOSPITAL ENCOUNTER (OUTPATIENT)
Dept: PHYSICAL THERAPY | Age: 43
Setting detail: THERAPIES SERIES
Discharge: HOME | End: 2023-07-10
Payer: COMMERCIAL

## 2023-07-10 NOTE — PROGRESS NOTES
[address]                Phone: [phone]  Fax: [phone]    Physical Therapy  Cancellation/No-show Note  Patient Name:  Tess Gee  :  1980   Date:  7/10/2023    For today's appointment patient:  [x]  Cancelled  []  Rescheduled appointment  []  No-show     Reason given by patient:  []  Patient ill  []  Conflicting appointment  []  No transportation    []  Conflict with work  []  No reason given  []  Other:     Comments:      Electronically signed by:  Lane Badillo PT

## 2023-07-12 ENCOUNTER — HOSPITAL ENCOUNTER (OUTPATIENT)
Dept: PHYSICAL THERAPY | Age: 43
Setting detail: THERAPIES SERIES
End: 2023-07-12
Payer: COMMERCIAL

## 2023-07-14 ENCOUNTER — OFFICE VISIT (OUTPATIENT)
Dept: PAIN MANAGEMENT | Age: 43
End: 2023-07-14
Payer: COMMERCIAL

## 2023-07-14 ENCOUNTER — APPOINTMENT (OUTPATIENT)
Dept: PHYSICAL THERAPY | Age: 43
End: 2023-07-14
Payer: COMMERCIAL

## 2023-07-14 ENCOUNTER — HOSPITAL ENCOUNTER (OUTPATIENT)
Dept: PHYSICAL THERAPY | Age: 43
Setting detail: THERAPIES SERIES
Discharge: HOME | End: 2023-07-14
Payer: COMMERCIAL

## 2023-07-14 VITALS
RESPIRATION RATE: 18 BRPM | TEMPERATURE: 97.7 F | OXYGEN SATURATION: 97 % | HEART RATE: 93 BPM | BODY MASS INDEX: 33.21 KG/M2 | HEIGHT: 70 IN | SYSTOLIC BLOOD PRESSURE: 158 MMHG | DIASTOLIC BLOOD PRESSURE: 90 MMHG | WEIGHT: 232 LBS

## 2023-07-14 DIAGNOSIS — F17.200 SMOKING: ICD-10-CM

## 2023-07-14 DIAGNOSIS — G89.29 CHRONIC PAIN OF RIGHT ANKLE: ICD-10-CM

## 2023-07-14 DIAGNOSIS — G89.29 CHRONIC FOOT PAIN, RIGHT: ICD-10-CM

## 2023-07-14 DIAGNOSIS — M25.571 CHRONIC PAIN OF RIGHT ANKLE: ICD-10-CM

## 2023-07-14 DIAGNOSIS — M79.671 CHRONIC FOOT PAIN, RIGHT: ICD-10-CM

## 2023-07-14 DIAGNOSIS — S82.841S CLOSED BIMALLEOLAR FRACTURE OF RIGHT ANKLE, SEQUELA: Primary | ICD-10-CM

## 2023-07-14 PROCEDURE — 99204 OFFICE O/P NEW MOD 45 MIN: CPT | Performed by: ANESTHESIOLOGY

## 2023-07-14 PROCEDURE — 97110 THERAPEUTIC EXERCISES: CPT

## 2023-07-14 RX ORDER — METHOCARBAMOL 750 MG/1
750 TABLET, FILM COATED ORAL 4 TIMES DAILY
COMMUNITY

## 2023-07-14 RX ORDER — MELOXICAM 15 MG/1
15 TABLET ORAL DAILY
COMMUNITY
Start: 2023-04-27

## 2023-07-14 RX ORDER — CITALOPRAM 20 MG/1
20 TABLET ORAL DAILY
COMMUNITY
Start: 2023-06-13

## 2023-07-14 RX ORDER — TRAZODONE HYDROCHLORIDE 100 MG/1
100 TABLET ORAL NIGHTLY
COMMUNITY
Start: 2023-06-17

## 2023-07-14 NOTE — PROGRESS NOTES
[address]                Phone: [phone]   Fax: [phone]    Physical Therapy Daily Treatment Note  Date:  2023    Patient Name:  Юлия Ortiz    :  1980  MRN: [id number]    Restrictions/Precautions:  Velcro boot use, Progressive WBAT  Diagnosis:  Closed Chato RT ankle Fx with RIGHT ANKLE OPEN REDUCTION INTERNAL FIXATION, SYNDESMOSIS REPAIR OF RIGHT ANKLE 5/92/10  Insurance/Certification information:  [employer] [place] [employer]  Referring Physician:  Dr Lynette Damon MD  Visit# / total visits:  -  Pain level: 4-8/10 RT ankle   Time In: 1:00  Time Out: 1:41    Subjective:  Reports walks with boot AAT but on some few occasions when in bed and needs to use RR. States is very careful when boot off. Has fused 1st ray/big toe fusion from prior foot Sx x 2 prior to this Fx and Sx. Had baseline no ROM big toe and minimal DF/PF and no toe mvmts big toe. Also has baseline pain with attempted and active foot/toe loading and mvmts. 23: slight inc pain on Monday as called off here 2/2 did a [employer] yard work weekend and ankle pain inc too much. Doing HEP as Blanca [address] Dr appt. 23. O: There-ex/grid  Cam boot mobility WBAT    A/P: Highly engaged and motivated. Some mild inc pain or stiffness with ROM ex. Inst to gentle ROM only at this point. Prior Sx's and impaired ROM will always limit ROM and he understands this.         Exercises:  Exercise/Equipment Resistance/Repetitions Other comments   Onestep  X 10'  Removed boot to bike,    RT DF stretch with GB   RT foot over square footrest x 15 @ 15 sec holds Inc pain RT foot, inst to gentle ROM only   Alphabet    X 3  F+ ROM, gentle  ROM   Ankle circles    60 x CW and 60x CCW F+ ROM, vladimir ROM    PF with TB   Gr 3 x 10  HEP   DF with TB   Gr 3 x 10  HEP   Seated windshield wipers   40 x seated  Reports tight, min inc pain             LAQ   10# CW @ 5 x 10  No diff   RT hip flexion     X 40

## 2023-07-17 ENCOUNTER — HOSPITAL ENCOUNTER (OUTPATIENT)
Dept: PHYSICAL THERAPY | Age: 43
Setting detail: THERAPIES SERIES
Discharge: HOME | End: 2023-07-17
Payer: COMMERCIAL

## 2023-07-17 NOTE — PROGRESS NOTES
[address]                Phone: [phone]  Fax: [phone]    Physical Therapy  Cancellation/No-show Note  Patient Name:  Zahira Cadet  :  1980   Date:  2023    For today's appointment patient:  []  Cancelled  []  Rescheduled appointment  [x]  No-show     Reason given by patient:  []  Patient ill  []  Conflicting appointment  []  No transportation    []  Conflict with work  []  No reason given  []  Other:     Comments:      Electronically signed by:  Bob Santana PT

## 2023-07-19 ENCOUNTER — HOSPITAL ENCOUNTER (OUTPATIENT)
Dept: PHYSICAL THERAPY | Age: 43
Setting detail: THERAPIES SERIES
Discharge: HOME | End: 2023-07-19
Payer: COMMERCIAL

## 2023-07-19 PROCEDURE — 97530 THERAPEUTIC ACTIVITIES: CPT

## 2023-07-19 PROCEDURE — 97110 THERAPEUTIC EXERCISES: CPT

## 2023-07-19 NOTE — PROGRESS NOTES
[address]                Phone: [phone]   Fax: [phone]    Physical Therapy Daily Treatment Note  Date:  2023    Patient Name:  Jp Oliveros    :  1980  MRN: [id number]    Restrictions/Precautions:  Velcro boot use, Progressive WBAT  Diagnosis:  Closed Chato RT ankle Fx with RIGHT ANKLE OPEN REDUCTION INTERNAL FIXATION, SYNDESMOSIS REPAIR OF RIGHT ANKLE   Insurance/Certification information:  [employer] [place] [employer]  Referring Physician:  Dr Bakari Castro MD  Visit# / total visits:    Pain level: 4-8/10 RT ankle   Time In: 8:33  Time Out: 9:29    Subjective:  Reports out of boot 2/2 Dr out of boot on the . Reports no real pain in ankle where Sx done but still has the baseline foot in front pain from last Sx. Very happy with Sx outcome. Walking in grass, on hills, in gravel and on all grounds doing lawn care w/o any issues. No ankle instability at all. Feeling much stronger and very happy. O: There-ex/grid  Amb w/o any CAM boot with a normal heel-toe gait cycle. AROM RT ankle WFL pain free all planes: DF -10*, PF 40*, Inv/Ever 20% < LT ankle. MMT RT ankle 4/5 all planes    A/P: Highly engaged and motivated. Gait normal w/o CAM boot. No pain with any plane there-ex. AROM improving all planes. Exercises:  Exercise/Equipment Resistance/Repetitions Other comments   Onestep  X 12'  No CAM any longer, No ankle stiffness RT   RT DF stretch with GB   RT foot over square footrest x 15 @ 15 sec holds No pain at all, DF -10*   Alphabet    X 4  Good ROM, gentle  ROM   Ankle circles    60 x CW and 60x CCW Good ROM, vladimir ROM    PF with TB   Gr 3 x 10  HEP   DF with TB   Gr 3 x 10  HEP   Seated windshield wipers   60 x seated B/L  No issues, feels good,             LAQ   10# CW @ 5 x 10  No diff   Sit-stand chair taps.           Stand knee curls   10# 5 x 10  No diff   Stool scoots for RT HS MM/knee flex MMT

## 2023-07-21 ENCOUNTER — HOSPITAL ENCOUNTER (OUTPATIENT)
Dept: PHYSICAL THERAPY | Age: 43
Setting detail: THERAPIES SERIES
Discharge: HOME | End: 2023-07-21
Payer: COMMERCIAL

## 2023-07-21 PROCEDURE — 97110 THERAPEUTIC EXERCISES: CPT

## 2023-07-21 PROCEDURE — 97530 THERAPEUTIC ACTIVITIES: CPT

## 2023-07-21 NOTE — PROGRESS NOTES
[address]                Phone: [phone]   Fax: [phone]    Physical Therapy Daily Treatment Note  Date:  2023    Patient Name:  Christopher Betts    :  1980  MRN: [id number]    Restrictions/Precautions:  Velcro boot use, Progressive WBAT  Diagnosis:  Closed Chato RT ankle Fx with RIGHT ANKLE OPEN REDUCTION INTERNAL FIXATION, SYNDESMOSIS REPAIR OF RIGHT ANKLE 3/77/74  Insurance/Certification information:  [employer] [place] [employer]  Referring Physician:  Dr Inna MD  Visit# / total visits:  -  Pain level: -8/10 RT ankle   Time In: 8:30  Time Out: 9: 36    Subjective:  Reports out of boot 2/ Dr out of boot on the . Reports no real pain in ankle where Sx done but still has the baseline foot in front pain from last Sx. Very happy with Sx outcome. Walking in grass, on hills, in gravel and on all grounds doing lawn care w/o any issues. No ankle instability at all. Feeling much stronger and very happy. O: There-ex/grid  Amb w/o any CAM boot with a normal heel-toe gait cycle. AROM RT ankle WFL pain free all planes: DF -10*, PF 40*, Inv/Ever 20% < LT ankle. MMT RT ankle 4/5 all planes    A/P: Highly engaged and motivated. Gait normal w/o CAM boot. No pain with any plane there-ex. AROM improving all planes. Exercises:  Exercise/Equipment Resistance/Repetitions Other comments   Onestep  X 15'  No CAM any longer, No ankle stiffness RT             LAQ   10# CW @ 5 x 10  No diff   Sit-stand chair taps. Stand knee curls   10# 5 x 10  No diff   Stool scoots for RT HS MM/knee flex MMT   Square stool with 50' scoots x 3, so total 300' as 48' one way x 2-there-back is 100' x 3 of them. Fatigue in RT leg, no pain RT ankle    Heel raises  4 x 10 wedge  No pain at all, good B/L heel raises    Rocker board standing Side-side and FWD-back @ 3 sets of 90 ea Highly coordin, no inc pain either directions.     Pyramid knee ext

## 2023-07-26 ENCOUNTER — HOSPITAL ENCOUNTER (OUTPATIENT)
Dept: PHYSICAL THERAPY | Age: 43
Setting detail: THERAPIES SERIES
Discharge: HOME | End: 2023-07-26
Payer: COMMERCIAL

## 2023-07-26 NOTE — PROGRESS NOTES
[address]                Phone: [phone]  Fax: [phone]    Physical Therapy  Cancellation/No-show Note  Patient Name:  Isabella Chen  :  1980   Date:  2023    For today's appointment patient:  []  Cancelled  []  Rescheduled appointment  [x]  No-show     Reason given by patient:  []  Patient ill  []  Conflicting appointment  []  No transportation    []  Conflict with work  []  No reason given  []  Other:     Comments:      Electronically signed by:  Prema Santana PT

## 2023-07-28 ENCOUNTER — HOSPITAL ENCOUNTER (OUTPATIENT)
Dept: PHYSICAL THERAPY | Age: 43
Setting detail: THERAPIES SERIES
Discharge: HOME | End: 2023-07-28
Payer: COMMERCIAL

## 2023-07-28 NOTE — PROGRESS NOTES
[address] [place]                Phone: [phone]  Fax: [phone]    Physical Therapy  Cancellation/No-show Note  Patient Name:  Zahira Cadet  :  1980   Date:  2023    For today's appointment patient:  []  Cancelled  []  Rescheduled appointment  [x]  No-show     Reason given by patient:  []  Patient ill  []  Conflicting appointment  []  No transportation    []  Conflict with work  []  No reason given  []  Other:     Comments:      Electronically signed by:  Bob Santana PT

## 2023-08-02 ENCOUNTER — HOSPITAL ENCOUNTER (OUTPATIENT)
Dept: PHYSICAL THERAPY | Age: 43
Setting detail: THERAPIES SERIES
Discharge: HOME | End: 2023-08-02

## 2023-08-02 NOTE — PROGRESS NOTES
[address]                Phone: [phone]  Fax: [phone]    Physical Therapy  Cancellation/No-show Note  Patient Name:  Jp Oliveros  :  1980   Date:  2023    For today's appointment patient:  []  Cancelled  []  Rescheduled appointment  [x]  No-show     Reason given by patient:  []  Patient ill  []  Conflicting appointment  []  No transportation    []  Conflict with work  []  No reason given  []  Other:     Comments:      Electronically signed by:  Danny Valerio PT

## 2023-08-04 ENCOUNTER — HOSPITAL ENCOUNTER (OUTPATIENT)
Dept: PHYSICAL THERAPY | Age: 43
Setting detail: THERAPIES SERIES
Discharge: HOME | End: 2023-08-04

## 2023-08-04 NOTE — PROGRESS NOTES
[address] [place]                Phone: [phone]  Fax: [phone]    Physical Therapy  Cancellation/No-show Note  Patient Name:  Janes Coornado  :  1980   Date:  2023    For today's appointment patient:  []  Cancelled  []  Rescheduled appointment  [x]  No-show     Reason given by patient:  []  Patient ill  []  Conflicting appointment  []  No transportation    []  Conflict with work  []  No reason given  []  Other:     Comments:      Electronically signed by:  Inder Connors PT

## 2023-08-07 ENCOUNTER — HOSPITAL ENCOUNTER (OUTPATIENT)
Dept: PHYSICAL THERAPY | Age: 43
Setting detail: THERAPIES SERIES
Discharge: HOME | End: 2023-08-07

## 2023-08-07 NOTE — PROGRESS NOTES
[address] [place]                Phone: [phone]  Fax: [phone]    Physical Therapy  Cancellation/No-show Note  Patient Name:  Amilcar Hermosillo  :  1980   Date:  2023    For today's appointment patient:  []  Cancelled  []  Rescheduled appointment  [x]  No-show     Reason given by patient:  []  Patient ill  []  Conflicting appointment  []  No transportation    []  Conflict with work  []  No reason given  []  Other:     Comments:      Electronically signed by:  Jaziel Mcgregor PT

## 2023-08-07 NOTE — PROGRESS NOTES
[address]                Phone: [phone]   Fax: [phone]      Physical Therapy  Non compliance/Attendance Issue    DATE:   8/7/2023            MRN: [id number]     PATIENT NAME:  Isabella Chen              Diagnosis:  Closed Chato RT ankle Fx with RIGHT ANKLE OPEN REDUCTION INTERNAL FIXATION, SYNDESMOSIS REPAIR OF RIGHT ANKLE 3/30/23    Total Visits to Date: 8  Cancels/No shows to Date: NS status from 7/21/23. Dear Dr. Dr Ave Truong MD    This is to notify you that per our physical therapy department policy your patient, noted above, is being discharged from Physical Therapy due to the following reason(s):     If a Physical Therapy out patient is absent from three consecutive scheduled appointments without notification    If a Physical Therapy out patient is absent for 50% of the scheduled visits       the patient will be discharged from physical therapy services. A new prescription will be necessary to resume physical therapy. If you have any questions, please feel free to contact me at [phone].     Thanks You,    Electronically Signed by: Prema Santana, PT      8/7/2023

## (undated) DEVICE — BANDAGE COMPR W4INXL10YD WHITE/BEIGE E MTRX HK LOOP CLSR

## (undated) DEVICE — TUBING SUCT 12FR MAL ALUM SHFT FN CAP VENT UNIV CONN W/ OBT

## (undated) DEVICE — LOWER EXT KNEE DRAPE: Brand: MEDLINE INDUSTRIES, INC.

## (undated) DEVICE — PADDING,UNDERCAST,COTTON, 4"X4YD STERILE: Brand: MEDLINE

## (undated) DEVICE — GOWN,AURORA,BRTHSLV,2XL,18/CS: Brand: MEDLINE

## (undated) DEVICE — GLOVE SURG SZ 8 L12IN FNGR THK79MIL GRN LTX FREE

## (undated) DEVICE — Device

## (undated) DEVICE — DRAPE EQUIP CARM 72X42 IN RUBBER BND CLP

## (undated) DEVICE — DRESSING,GAUZE,XEROFORM,CURAD,5"X9",ST: Brand: CURAD

## (undated) DEVICE — DRILL BIT, AO DIA2.6MM X 135MM, SCALED: Brand: VARIAX

## (undated) DEVICE — GLOVE ORTHO 8   MSG9480